# Patient Record
Sex: FEMALE | Race: WHITE | NOT HISPANIC OR LATINO | Employment: UNEMPLOYED | URBAN - METROPOLITAN AREA
[De-identification: names, ages, dates, MRNs, and addresses within clinical notes are randomized per-mention and may not be internally consistent; named-entity substitution may affect disease eponyms.]

---

## 2019-09-26 ENCOUNTER — TELEPHONE (OUTPATIENT)
Dept: FAMILY MEDICINE | Facility: CLINIC | Age: 59
End: 2019-09-26

## 2019-09-26 NOTE — TELEPHONE ENCOUNTER
----- Message from Matteo Burkett sent at 9/26/2019  1:17 PM CDT -----  Contact: pt  Type:  Sooner Apoointment Request    Caller is requesting a sooner appointment.  Caller declined first available appointment listed below.  Caller will not accept being placed on the waitlist and is requesting a message be sent to doctor.    Name of Caller:  pt  When is the first available appointment?  Epic kept pushing it out without giving a date     Best Call Back Number:  [phone]  Additional Information:  Pt would be a new pt. Pt has Medicare and Medicaid. Pt would like to est care with Dr. Hare.

## 2019-10-01 ENCOUNTER — TELEPHONE (OUTPATIENT)
Dept: FAMILY MEDICINE | Facility: CLINIC | Age: 59
End: 2019-10-01

## 2019-10-01 NOTE — TELEPHONE ENCOUNTER
Call placed to patient, advised that due to insurance we cannot reschedule the appointment as a new patient to family medicine.     Advised to call number on back of card

## 2019-10-01 NOTE — TELEPHONE ENCOUNTER
----- Message from Lizbet Walton sent at 10/1/2019 12:50 PM CDT -----  Type:  Sooner Apoointment Request    Caller is requesting a sooner appointment.     Name of Caller:  self  When is the first available appointment?  N/a   Symptoms:  Has a 1:20 appt today / cannot keep appointment / car / transportation issues   Best Call Back Number:  [phone] (home)     Additional Information:  Asking to reschedule to tomorrow

## 2019-10-09 ENCOUNTER — TELEPHONE (OUTPATIENT)
Dept: FAMILY MEDICINE | Facility: CLINIC | Age: 59
End: 2019-10-09

## 2019-10-11 ENCOUNTER — LAB VISIT (OUTPATIENT)
Dept: LAB | Facility: HOSPITAL | Age: 59
End: 2019-10-11
Attending: INTERNAL MEDICINE
Payer: MEDICARE

## 2019-10-11 ENCOUNTER — OFFICE VISIT (OUTPATIENT)
Dept: FAMILY MEDICINE | Facility: CLINIC | Age: 59
End: 2019-10-11
Payer: MEDICARE

## 2019-10-11 VITALS
SYSTOLIC BLOOD PRESSURE: 128 MMHG | BODY MASS INDEX: 20.16 KG/M2 | HEIGHT: 66 IN | HEART RATE: 78 BPM | DIASTOLIC BLOOD PRESSURE: 84 MMHG | OXYGEN SATURATION: 98 % | TEMPERATURE: 98 F | WEIGHT: 125.44 LBS

## 2019-10-11 DIAGNOSIS — M81.0 OSTEOPOROSIS, UNSPECIFIED OSTEOPOROSIS TYPE, UNSPECIFIED PATHOLOGICAL FRACTURE PRESENCE: ICD-10-CM

## 2019-10-11 DIAGNOSIS — Z12.39 SCREENING FOR BREAST CANCER: ICD-10-CM

## 2019-10-11 DIAGNOSIS — G89.4 CHRONIC PAIN SYNDROME: ICD-10-CM

## 2019-10-11 DIAGNOSIS — F43.22 ADJUSTMENT REACTION WITH ANXIETY: ICD-10-CM

## 2019-10-11 DIAGNOSIS — F10.180 ALCOHOL ABUSE WITH ALCOHOL-INDUCED ANXIETY DISORDER: ICD-10-CM

## 2019-10-11 DIAGNOSIS — F31.9 BIPOLAR 1 DISORDER: Primary | ICD-10-CM

## 2019-10-11 DIAGNOSIS — Z72.0 TOBACCO ABUSE: ICD-10-CM

## 2019-10-11 DIAGNOSIS — Z79.899 HIGH RISK MEDICATION USE: ICD-10-CM

## 2019-10-11 DIAGNOSIS — J44.9 CHRONIC OBSTRUCTIVE PULMONARY DISEASE, UNSPECIFIED COPD TYPE: ICD-10-CM

## 2019-10-11 DIAGNOSIS — Z76.89 ENCOUNTER TO ESTABLISH CARE: ICD-10-CM

## 2019-10-11 LAB
25(OH)D3+25(OH)D2 SERPL-MCNC: 31 NG/ML (ref 30–96)
ALBUMIN SERPL BCP-MCNC: 4.2 G/DL (ref 3.5–5.2)
ALP SERPL-CCNC: 116 U/L (ref 55–135)
ALT SERPL W/O P-5'-P-CCNC: 15 U/L (ref 10–44)
ANION GAP SERPL CALC-SCNC: 11 MMOL/L (ref 8–16)
AST SERPL-CCNC: 17 U/L (ref 10–40)
BILIRUB SERPL-MCNC: 0.2 MG/DL (ref 0.1–1)
BUN SERPL-MCNC: 17 MG/DL (ref 6–20)
CALCIUM SERPL-MCNC: 8.7 MG/DL (ref 8.7–10.5)
CHLORIDE SERPL-SCNC: 108 MMOL/L (ref 95–110)
CHOLEST SERPL-MCNC: 274 MG/DL (ref 120–199)
CHOLEST/HDLC SERPL: 3.4 {RATIO} (ref 2–5)
CO2 SERPL-SCNC: 26 MMOL/L (ref 23–29)
CREAT SERPL-MCNC: 0.7 MG/DL (ref 0.5–1.4)
ERYTHROCYTE [DISTWIDTH] IN BLOOD BY AUTOMATED COUNT: 14.5 % (ref 11.5–14.5)
EST. GFR  (AFRICAN AMERICAN): >60 ML/MIN/1.73 M^2
EST. GFR  (NON AFRICAN AMERICAN): >60 ML/MIN/1.73 M^2
GLUCOSE SERPL-MCNC: 82 MG/DL (ref 70–110)
HCT VFR BLD AUTO: 49.8 % (ref 37–48.5)
HDLC SERPL-MCNC: 80 MG/DL (ref 40–75)
HDLC SERPL: 29.2 % (ref 20–50)
HGB BLD-MCNC: 15.8 G/DL (ref 12–16)
LDLC SERPL CALC-MCNC: 149 MG/DL (ref 63–159)
MCH RBC QN AUTO: 30.6 PG (ref 27–31)
MCHC RBC AUTO-ENTMCNC: 31.7 G/DL (ref 32–36)
MCV RBC AUTO: 96 FL (ref 82–98)
NONHDLC SERPL-MCNC: 194 MG/DL
PLATELET # BLD AUTO: 257 K/UL (ref 150–350)
PMV BLD AUTO: 11.4 FL (ref 9.2–12.9)
POTASSIUM SERPL-SCNC: 4.2 MMOL/L (ref 3.5–5.1)
PROT SERPL-MCNC: 7.4 G/DL (ref 6–8.4)
RBC # BLD AUTO: 5.17 M/UL (ref 4–5.4)
SODIUM SERPL-SCNC: 145 MMOL/L (ref 136–145)
T4 FREE SERPL-MCNC: 0.86 NG/DL (ref 0.71–1.51)
TRIGL SERPL-MCNC: 225 MG/DL (ref 30–150)
TSH SERPL DL<=0.005 MIU/L-ACNC: 0.12 UIU/ML (ref 0.4–4)
WBC # BLD AUTO: 9.81 K/UL (ref 3.9–12.7)

## 2019-10-11 PROCEDURE — 85027 COMPLETE CBC AUTOMATED: CPT

## 2019-10-11 PROCEDURE — 84443 ASSAY THYROID STIM HORMONE: CPT

## 2019-10-11 PROCEDURE — 99204 PR OFFICE/OUTPT VISIT, NEW, LEVL IV, 45-59 MIN: ICD-10-PCS | Mod: S$PBB,,, | Performed by: INTERNAL MEDICINE

## 2019-10-11 PROCEDURE — 99999 PR PBB SHADOW E&M-EST. PATIENT-LVL III: CPT | Mod: PBBFAC,,, | Performed by: INTERNAL MEDICINE

## 2019-10-11 PROCEDURE — 80053 COMPREHEN METABOLIC PANEL: CPT

## 2019-10-11 PROCEDURE — 82306 VITAMIN D 25 HYDROXY: CPT

## 2019-10-11 PROCEDURE — 99213 OFFICE O/P EST LOW 20 MIN: CPT | Mod: PBBFAC,PO | Performed by: INTERNAL MEDICINE

## 2019-10-11 PROCEDURE — 80061 LIPID PANEL: CPT

## 2019-10-11 PROCEDURE — 99999 PR PBB SHADOW E&M-EST. PATIENT-LVL III: ICD-10-PCS | Mod: PBBFAC,,, | Performed by: INTERNAL MEDICINE

## 2019-10-11 PROCEDURE — 99204 OFFICE O/P NEW MOD 45 MIN: CPT | Mod: S$PBB,,, | Performed by: INTERNAL MEDICINE

## 2019-10-11 PROCEDURE — 84439 ASSAY OF FREE THYROXINE: CPT

## 2019-10-11 PROCEDURE — 36415 COLL VENOUS BLD VENIPUNCTURE: CPT | Mod: PO

## 2019-10-11 RX ORDER — ALBUTEROL SULFATE 90 UG/1
2 AEROSOL, METERED RESPIRATORY (INHALATION) EVERY 6 HOURS PRN
Qty: 18 G | Refills: 1 | Status: SHIPPED | OUTPATIENT
Start: 2019-10-11 | End: 2020-05-12

## 2019-10-11 RX ORDER — BUDESONIDE AND FORMOTEROL FUMARATE DIHYDRATE 80; 4.5 UG/1; UG/1
2 AEROSOL RESPIRATORY (INHALATION) 2 TIMES DAILY
Qty: 10.2 G | Refills: 2 | Status: SHIPPED | OUTPATIENT
Start: 2019-10-11 | End: 2019-12-07 | Stop reason: SDUPTHER

## 2019-10-11 RX ORDER — OLANZAPINE 10 MG/1
10 TABLET ORAL NIGHTLY
Qty: 30 TABLET | Refills: 1 | Status: SHIPPED | OUTPATIENT
Start: 2019-10-11 | End: 2019-12-07 | Stop reason: SDUPTHER

## 2019-10-11 NOTE — PROGRESS NOTES
Subjective     Lizbet Cooley is a 58 y.o. old, female here for Establish Care; Refills; Hard time functioning; and Hard time breathing    Patient is here to establish care.  She is a 58-year-old with past medical history of COPD, osteoporosis, alcohol abuse, anxiety, bipolar disorder, chronic pain.    She was recently incarcerated for almost 3 years.  She has been out of snf for the past 3 months and had difficulty coping.  She complains of racing thoughts, constant anxiety, difficulty relaxing, insomnia, and has resumed drinking alcohol at night.  She has a history of multiple DUI's which is what led to her incarceration.  She drinks 3-4 beers at night and had some from her family.    She is inconsistent regarding her history of mental illness.  She has been on benzodiazepines in the past such as Valium.  After she denies any significant issues but then endorses she has been diagnosed with bipolar disorder.  She has very little insight into her past psychiatric diagnoses.  She states that she has been on disability would is unsure if this was because of psychiatric reasons or because of her COPD.  She was living with 1 of her sons but left to live with her sister that is an alcoholic and her younger son that abuses methamphetamines and heroin.  Patient denies any recent illicit substance use.  She did buy her son Suboxone off the street.  She was having difficulty in that environment and recently started living with her daughter.  She would like to be able to get her own place and start working again.  She previously cleaned houses for living.  Past medication trials:  Prozac:  Made her feel like a zombie  Seroquel:  Made her fall, could not take during the day.  Valium:  Helped  She does not recall any other medications use in the past.    COPD:  She was previously on albuterol and Symbicort.  She started smoking again recently.    Osteoporosis:  No available records to review. She states that she was on  Fosamax previously.    Alcohol abuse:  See above, she has not previously been on treatment.    Review of Systems   Constitutional: Negative for chills and fever.   HENT: Negative for ear pain and sinus pain.    Eyes: Negative for blurred vision and pain.   Respiratory: Negative for cough and shortness of breath.    Cardiovascular: Negative for chest pain and palpitations.   Gastrointestinal: Negative for abdominal pain and nausea.   Genitourinary: Negative for dysuria and frequency.   Musculoskeletal: Negative for joint pain and myalgias.   Skin: Negative for itching and rash.   Neurological: Negative for weakness and headaches.   Endo/Heme/Allergies: Negative for environmental allergies. Does not bruise/bleed easily.   Psychiatric/Behavioral: Positive for substance abuse. Negative for depression. The patient is nervous/anxious and has insomnia.        Past Medical History:   Diagnosis Date    Anxiety     Bipolar disorder     COPD (chronic obstructive pulmonary disease)     Osteoporosis      History reviewed. No pertinent surgical history.  Review of patient's allergies indicates:  No Known Allergies  No outpatient medications have been marked as taking for the 10/11/19 encounter (Office Visit) with Lemuel Vaughan MD.     Social History     Socioeconomic History    Marital status: Single     Spouse name: Not on file    Number of children: Not on file    Years of education: Not on file    Highest education level: Not on file   Occupational History    Not on file   Social Needs    Financial resource strain: Not on file    Food insecurity:     Worry: Not on file     Inability: Not on file    Transportation needs:     Medical: Not on file     Non-medical: Not on file   Tobacco Use    Smoking status: Current Every Day Smoker     Packs/day: 1.00     Types: Cigarettes    Smokeless tobacco: Never Used   Substance and Sexual Activity    Alcohol use: Yes     Drinks per session: 3 or 4     Binge frequency:  "Daily or almost daily     Comment: h/o etoh abuse    Drug use: Not Currently    Sexual activity: Not Currently   Lifestyle    Physical activity:     Days per week: Not on file     Minutes per session: Not on file    Stress: Not on file   Relationships    Social connections:     Talks on phone: Not on file     Gets together: Not on file     Attends Jehov[employer]'s witness service: Not on file     Active member of club or organization: Not on file     Attends meetings of clubs or organizations: Not on file     Relationship status: Not on file   Other Topics Concern    Not on file   Social History Narrative    Not on file     Family History   Problem Relation Age of Onset    Alcohol abuse Sister     Drug abuse Son      Objective     /84   Pulse 78   Temp 98.4 °F (36.9 °C) (Temporal)   Ht 5' 6" (1.676 m)   Wt 56.9 kg (125 lb 7.1 oz)   SpO2 98%   BMI 20.25 kg/m²   Physical Exam   Constitutional: She is oriented to person, place, and time. She appears well-developed. No distress.   HENT:   Head: Normocephalic and atraumatic.   Mouth/Throat: Oropharynx is clear and moist and mucous membranes are normal.   Eyes: Pupils are equal, round, and reactive to light. Conjunctivae are normal.   Neck: Neck supple. No thyroid mass and no thyromegaly present.   Cardiovascular: Normal rate, regular rhythm and normal heart sounds.   No murmur heard.  Pulmonary/Chest: Breath sounds normal. No respiratory distress.   Abdominal: Soft. Normal appearance and bowel sounds are normal. There is no tenderness.   Musculoskeletal: She exhibits no edema or deformity.   Lymphadenopathy:     She has no cervical adenopathy.        Right: No supraclavicular adenopathy present.        Left: No supraclavicular adenopathy present.   Neurological: She is alert and oriented to person, place, and time.   Skin: Skin is warm and dry. No rash noted.   Psychiatric: Her mood appears anxious. Her speech is rapid and/or pressured. She is hyperactive. She " expresses impulsivity.     Assessment and Plan     1. Bipolar 1 disorder  F/u in 2 weeks.  - OLANZapine (ZYPREXA) 10 MG tablet; Take 1 tablet (10 mg total) by mouth every evening. Start 1/2 tab the first week then increase  Dispense: 30 tablet; Refill: 1  - Ambulatory referral to Psychiatry    2. Adjustment reaction with anxiety  In the setting of sig psychiatric co-morbid conditions and etoh abuse.    3. Chronic obstructive pulmonary disease, unspecified COPD type  restart  - budesonide-formoterol 80-4.5 mcg (SYMBICORT) 80-4.5 mcg/actuation HFAA; Inhale 2 puffs into the lungs 2 (two) times daily. Controller  Dispense: 10.2 g; Refill: 2  - albuterol (PROAIR HFA) 90 mcg/actuation inhaler; Inhale 2 puffs into the lungs every 6 (six) hours as needed for Wheezing. Rescue  Dispense: 18 g; Refill: 1    4. Osteoporosis, unspecified osteoporosis type, unspecified pathological fracture presence  Consider dexa and bisphosphonate therapy.  - TSH; Future  - Vitamin D; Future    5. Screening for breast cancer    6. Alcohol abuse with alcohol-induced anxiety disorder  Counseling, consider naltrexone or suboxone    7. Chronic pain syndrome  Prior chronic opiate use, sig FH of OUD and polysubstance abuse  High risk ORT    8. Tobacco abuse  Counseling.    9. High risk medication use  - CBC Without Differential; Future  - Comprehensive metabolic panel; Future  - Lipid panel; Future      ___________________  Lemuel Vaughan MD  Internal Medicine and Pediatrics

## 2019-11-09 ENCOUNTER — TELEPHONE (OUTPATIENT)
Dept: FAMILY MEDICINE | Facility: CLINIC | Age: 59
End: 2019-11-09

## 2019-11-09 NOTE — TELEPHONE ENCOUNTER
Called patient in regards to rescheduling her 2 week follow up. No answer. Forwarded straight to . Left  to return phone call to clinic.

## 2019-11-14 ENCOUNTER — OFFICE VISIT (OUTPATIENT)
Dept: ORTHOPEDICS | Facility: CLINIC | Age: 59
End: 2019-11-14
Payer: MEDICARE

## 2019-11-14 VITALS
DIASTOLIC BLOOD PRESSURE: 84 MMHG | HEART RATE: 84 BPM | HEIGHT: 66 IN | BODY MASS INDEX: 20.25 KG/M2 | WEIGHT: 126 LBS | SYSTOLIC BLOOD PRESSURE: 151 MMHG

## 2019-11-14 DIAGNOSIS — S82.852A TRIMALLEOLAR FRACTURE OF ANKLE, CLOSED, LEFT, INITIAL ENCOUNTER: ICD-10-CM

## 2019-11-14 DIAGNOSIS — F17.210 CIGARETTE NICOTINE DEPENDENCE WITHOUT COMPLICATION: ICD-10-CM

## 2019-11-14 PROCEDURE — 99999 PR PBB SHADOW E&M-EST. PATIENT-LVL III: ICD-10-PCS | Mod: PBBFAC,,, | Performed by: ORTHOPAEDIC SURGERY

## 2019-11-14 PROCEDURE — 29515 APPLICATION SHORT LEG SPLINT: CPT | Mod: S$PBB,LT,, | Performed by: ORTHOPAEDIC SURGERY

## 2019-11-14 PROCEDURE — 99213 OFFICE O/P EST LOW 20 MIN: CPT | Mod: PBBFAC,PN,25 | Performed by: ORTHOPAEDIC SURGERY

## 2019-11-14 PROCEDURE — 99204 PR OFFICE/OUTPT VISIT, NEW, LEVL IV, 45-59 MIN: ICD-10-PCS | Mod: S$PBB,25,, | Performed by: ORTHOPAEDIC SURGERY

## 2019-11-14 PROCEDURE — 99999 PR PBB SHADOW E&M-EST. PATIENT-LVL III: CPT | Mod: PBBFAC,,, | Performed by: ORTHOPAEDIC SURGERY

## 2019-11-14 PROCEDURE — 99406 PR TOBACCO USE CESSATION INTERMEDIATE 3-10 MINUTES: ICD-10-PCS | Mod: S$PBB,,, | Performed by: ORTHOPAEDIC SURGERY

## 2019-11-14 PROCEDURE — 99406 BEHAV CHNG SMOKING 3-10 MIN: CPT | Mod: S$PBB,,, | Performed by: ORTHOPAEDIC SURGERY

## 2019-11-14 PROCEDURE — 99204 OFFICE O/P NEW MOD 45 MIN: CPT | Mod: S$PBB,25,, | Performed by: ORTHOPAEDIC SURGERY

## 2019-11-14 PROCEDURE — 29515 APPLICATION SHORT LEG SPLINT: CPT | Mod: PBBFAC,PN | Performed by: ORTHOPAEDIC SURGERY

## 2019-11-14 PROCEDURE — 29515 PR APPLY LOWER LEG SPLINT: ICD-10-PCS | Mod: S$PBB,LT,, | Performed by: ORTHOPAEDIC SURGERY

## 2019-11-14 RX ORDER — ONDANSETRON 8 MG/1
8 TABLET, ORALLY DISINTEGRATING ORAL EVERY 6 HOURS PRN
Qty: 6 TABLET | Refills: 1 | Status: SHIPPED | OUTPATIENT
Start: 2019-11-14 | End: 2022-04-20

## 2019-11-14 RX ORDER — IBUPROFEN 200 MG
400 TABLET ORAL EVERY 6 HOURS PRN
COMMUNITY
End: 2020-05-10 | Stop reason: CLARIF

## 2019-11-14 RX ORDER — HYDROCODONE BITARTRATE AND ACETAMINOPHEN 5; 325 MG/1; MG/1
1 TABLET ORAL EVERY 6 HOURS PRN
Qty: 22 TABLET | Refills: 0 | Status: SHIPPED | OUTPATIENT
Start: 2019-11-14 | End: 2021-02-21 | Stop reason: SDUPTHER

## 2019-11-18 ENCOUNTER — TELEPHONE (OUTPATIENT)
Dept: ORTHOPEDICS | Facility: CLINIC | Age: 59
End: 2019-11-18

## 2019-11-18 NOTE — TELEPHONE ENCOUNTER
----- Message from Cornelius Jose sent at 11/18/2019 10:27 AM CST -----  Contact: pt  Patient needed to enrico which I did and she has questions about pain medication, [phone]

## 2019-11-18 NOTE — TELEPHONE ENCOUNTER
Spoke to patient. Advised that she really needs to come in the clinic today or tomorrow to have her skin elevated in preparation for surgery. Patient to call back.

## 2019-11-19 PROBLEM — Z72.0 TOBACCO ABUSE: Status: RESOLVED | Noted: 2019-10-11 | Resolved: 2019-11-19

## 2019-11-19 PROBLEM — F17.210 CIGARETTE NICOTINE DEPENDENCE WITHOUT COMPLICATION: Status: ACTIVE | Noted: 2019-11-19

## 2019-11-19 NOTE — PROGRESS NOTES
"HPI: Lizbet Cooley is a  59 y.o. female who complains of left ankle pain. The pain began acutely on 11/11/2019 when she tripped over a shoe injuring her ankle. She was seen in the ER and placed in a splint. She rates her pain as 10/10 today.  She has h/o nicotine and alcohol abuse. She says she drinks 2-3 beers per day. She smokes 1 ppd of cigarettes for 35 years.     PAST MEDICAL/SURGICAL/FAMILY/SOCIAL/ HISTORY: REVIEWED    ALLERGIES/MEDICATIONS: REVIEWED       Review of Systems:     Constitution: Negative.   HEENT: Negative.   Eyes: Negative.   Cardiovascular: Negative.   Respiratory: Negative.   Endocrine: Negative.   Hematologic/Lymphatic: Negative.   Skin: Negative.   Musculoskeletal: Positive for left ankle pain   Gastrointestinal: Negative.   Genitourinary: Negative.   Neurological: Negative.   Psychiatric/Behavioral: Negative.   Allergic/Immunologic: Negative.       PHYSICAL EXAM:  Vitals:    11/14/19 1337   BP: (!) 151/84   Pulse: 84     Ht Readings from Last 1 Encounters:   11/14/19 5' 6" (1.676 m)     Wt Readings from Last 1 Encounters:   11/14/19 57.2 kg (126 lb)       GENERAL: Well developed, well nourished, no acute distress.  SKIN: Skin is intact. No atrophy, abrasions or lesions are noted.   Neurological: Normal mental status. Appropriate and conversant. Alert and oriented x 3.  GAIT: Unable to bear weight LLE    Left  lower extremity compared with RLE:  2+ dorsalis pedis pulse.  Capillary refill < 3 seconds. Limited exam due to pain and swelling. Diminished Sensation to light touch sural, saphenous, superficial peroneal and deep peroneal nerves. Severe swelling and ecchymosis no blistering. No lymphadenopathy, no masses or tumors palpated.      XRAYS:   3 views of left ankle reviewed today show  trimalleolar ankle fracture with mild displacement.       ASSESSMENT:   Medications Ordered This Encounter   Medications    ondansetron (ZOFRAN-ODT) 8 MG TbDL       Encounter Diagnosis   Name Primary?    " Trimalleolar fracture of ankle, closed, left, initial encounter         PLAN:   I spent 45 minutes in consulation with the patient today. More than half the time was spent counseling the patient on her condition. I recommend ORIF left trimalleolar ankle frx once swelling has diminished.     Assistance with smoking cessation was offered, including:  [x]  Medications  [x]  Counseling  [x]  Printed Information on Smoking Cessation  [x]  Referral to a Smoking Cessation Program    Patient was counseled regarding smoking for 3-10 minutes. I told her that nicotine of any kind prevents bone healing.   Short leg splint applied to LLE,  Non-weightbearing.    I also wrote her an prescription for norco 5 sig 1 po q6 hrs prn pain and I reminded her not to take this medication with alcohol. Strict nwb and elevation.  I will recheck her skin on Monday.  Plan for surgery Wed if skin is appropriate.

## 2019-12-07 DIAGNOSIS — J44.9 CHRONIC OBSTRUCTIVE PULMONARY DISEASE, UNSPECIFIED COPD TYPE: ICD-10-CM

## 2019-12-07 DIAGNOSIS — F31.9 BIPOLAR 1 DISORDER: ICD-10-CM

## 2019-12-09 RX ORDER — OLANZAPINE 10 MG/1
10 TABLET ORAL NIGHTLY
Qty: 30 TABLET | Refills: 0 | Status: SHIPPED | OUTPATIENT
Start: 2019-12-09 | End: 2020-04-13 | Stop reason: SDUPTHER

## 2019-12-09 RX ORDER — BUDESONIDE AND FORMOTEROL FUMARATE DIHYDRATE 80; 4.5 UG/1; UG/1
AEROSOL RESPIRATORY (INHALATION)
Qty: 10.2 INHALER | Refills: 2 | Status: SHIPPED | OUTPATIENT
Start: 2019-12-09 | End: 2020-05-12

## 2020-01-02 DIAGNOSIS — F31.9 BIPOLAR 1 DISORDER: ICD-10-CM

## 2020-01-02 RX ORDER — OLANZAPINE 10 MG/1
TABLET ORAL
Qty: 30 TABLET | Refills: 0 | OUTPATIENT
Start: 2020-01-02

## 2020-04-11 DIAGNOSIS — F31.9 BIPOLAR 1 DISORDER: ICD-10-CM

## 2020-04-13 RX ORDER — OLANZAPINE 10 MG/1
TABLET ORAL
Qty: 30 TABLET | Refills: 1 | Status: SHIPPED | OUTPATIENT
Start: 2020-04-13 | End: 2020-05-10 | Stop reason: CLARIF

## 2020-05-12 DIAGNOSIS — J44.9 CHRONIC OBSTRUCTIVE PULMONARY DISEASE, UNSPECIFIED COPD TYPE: ICD-10-CM

## 2020-05-12 RX ORDER — ALBUTEROL SULFATE 90 UG/1
2 AEROSOL, METERED RESPIRATORY (INHALATION) EVERY 6 HOURS PRN
Qty: 18 G | Refills: 1 | Status: SHIPPED | OUTPATIENT
Start: 2020-05-12 | End: 2020-07-06

## 2020-05-12 RX ORDER — BUDESONIDE AND FORMOTEROL FUMARATE DIHYDRATE 80; 4.5 UG/1; UG/1
AEROSOL RESPIRATORY (INHALATION)
Qty: 10.2 INHALER | Refills: 2 | Status: SHIPPED | OUTPATIENT
Start: 2020-05-12 | End: 2020-05-22

## 2020-05-21 DIAGNOSIS — J44.9 CHRONIC OBSTRUCTIVE PULMONARY DISEASE, UNSPECIFIED COPD TYPE: ICD-10-CM

## 2020-05-22 RX ORDER — BUDESONIDE AND FORMOTEROL FUMARATE DIHYDRATE 80; 4.5 UG/1; UG/1
AEROSOL RESPIRATORY (INHALATION)
Qty: 10.2 INHALER | Refills: 2 | Status: SHIPPED | OUTPATIENT
Start: 2020-05-22 | End: 2021-01-26 | Stop reason: SDUPTHER

## 2021-01-13 ENCOUNTER — TELEPHONE (OUTPATIENT)
Dept: FAMILY MEDICINE | Facility: CLINIC | Age: 61
End: 2021-01-13

## 2021-01-26 ENCOUNTER — HOSPITAL ENCOUNTER (OUTPATIENT)
Dept: RADIOLOGY | Facility: HOSPITAL | Age: 61
Discharge: HOME | End: 2021-01-26
Attending: INTERNAL MEDICINE
Payer: MEDICARE

## 2021-01-26 ENCOUNTER — OFFICE VISIT (OUTPATIENT)
Dept: FAMILY MEDICINE | Facility: CLINIC | Age: 61
End: 2021-01-26
Payer: MEDICARE

## 2021-01-26 VITALS
TEMPERATURE: 98 F | DIASTOLIC BLOOD PRESSURE: 78 MMHG | HEART RATE: 99 BPM | BODY MASS INDEX: 20.46 KG/M2 | OXYGEN SATURATION: 97 % | WEIGHT: 126.75 LBS | SYSTOLIC BLOOD PRESSURE: 124 MMHG

## 2021-01-26 DIAGNOSIS — G89.4 CHRONIC PAIN SYNDROME: ICD-10-CM

## 2021-01-26 DIAGNOSIS — Z72.0 TOBACCO ABUSE: ICD-10-CM

## 2021-01-26 DIAGNOSIS — J40 BRONCHITIS: ICD-10-CM

## 2021-01-26 DIAGNOSIS — J40 BRONCHITIS: Primary | ICD-10-CM

## 2021-01-26 DIAGNOSIS — J44.9 CHRONIC OBSTRUCTIVE PULMONARY DISEASE, UNSPECIFIED COPD TYPE: ICD-10-CM

## 2021-01-26 DIAGNOSIS — Z79.891 CHRONIC PRESCRIPTION OPIATE USE: ICD-10-CM

## 2021-01-26 PROBLEM — F17.210 CIGARETTE NICOTINE DEPENDENCE WITHOUT COMPLICATION: Status: RESOLVED | Noted: 2019-11-19 | Resolved: 2021-01-26

## 2021-01-26 PROCEDURE — 99213 OFFICE O/P EST LOW 20 MIN: CPT | Mod: PBBFAC,PO,25 | Performed by: INTERNAL MEDICINE

## 2021-01-26 PROCEDURE — 71046 XR CHEST PA AND LATERAL: ICD-10-PCS | Mod: 26,,, | Performed by: RADIOLOGY

## 2021-01-26 PROCEDURE — 99214 OFFICE O/P EST MOD 30 MIN: CPT | Mod: S$GLB,,, | Performed by: INTERNAL MEDICINE

## 2021-01-26 PROCEDURE — 99499 RISK ADDL DX/OHS AUDIT: ICD-10-PCS | Mod: S$GLB,,, | Performed by: INTERNAL MEDICINE

## 2021-01-26 PROCEDURE — 99214 PR OFFICE/OUTPT VISIT, EST, LEVL IV, 30-39 MIN: ICD-10-PCS | Mod: S$GLB,,, | Performed by: INTERNAL MEDICINE

## 2021-01-26 PROCEDURE — 71046 X-RAY EXAM CHEST 2 VIEWS: CPT | Mod: TC,FY,PO

## 2021-01-26 PROCEDURE — 99499 UNLISTED E&M SERVICE: CPT | Mod: S$GLB,,, | Performed by: INTERNAL MEDICINE

## 2021-01-26 PROCEDURE — 99999 PR PBB SHADOW E&M-EST. PATIENT-LVL III: CPT | Mod: PBBFAC,,, | Performed by: INTERNAL MEDICINE

## 2021-01-26 PROCEDURE — 71046 X-RAY EXAM CHEST 2 VIEWS: CPT | Mod: 26,,, | Performed by: RADIOLOGY

## 2021-01-26 PROCEDURE — 99999 PR PBB SHADOW E&M-EST. PATIENT-LVL III: ICD-10-PCS | Mod: PBBFAC,,, | Performed by: INTERNAL MEDICINE

## 2021-01-26 RX ORDER — NALOXONE HYDROCHLORIDE 4 MG/.1ML
SPRAY NASAL
COMMUNITY
Start: 2021-01-14

## 2021-01-26 RX ORDER — BENZONATATE 200 MG/1
200 CAPSULE ORAL 3 TIMES DAILY PRN
Qty: 20 CAPSULE | Refills: 0 | Status: SHIPPED | OUTPATIENT
Start: 2021-01-26 | End: 2021-02-05

## 2021-01-26 RX ORDER — OXYCODONE AND ACETAMINOPHEN 10; 325 MG/1; MG/1
TABLET ORAL
COMMUNITY
Start: 2021-01-14

## 2021-01-26 RX ORDER — BENZONATATE 100 MG/1
CAPSULE ORAL
COMMUNITY
Start: 2021-01-15 | End: 2021-01-26

## 2021-01-26 RX ORDER — AMOXICILLIN AND CLAVULANATE POTASSIUM 875; 125 MG/1; MG/1
1 TABLET, FILM COATED ORAL EVERY 12 HOURS
Qty: 14 TABLET | Refills: 0 | Status: SHIPPED | OUTPATIENT
Start: 2021-01-26 | End: 2021-02-02

## 2021-01-26 RX ORDER — BUDESONIDE AND FORMOTEROL FUMARATE DIHYDRATE 80; 4.5 UG/1; UG/1
2 AEROSOL RESPIRATORY (INHALATION) 2 TIMES DAILY
Qty: 10.2 INHALER | Refills: 2 | Status: SHIPPED | OUTPATIENT
Start: 2021-01-26 | End: 2021-09-13 | Stop reason: SDUPTHER

## 2021-01-28 ENCOUNTER — TELEPHONE (OUTPATIENT)
Dept: FAMILY MEDICINE | Facility: CLINIC | Age: 61
End: 2021-01-28

## 2021-01-28 DIAGNOSIS — Z72.0 TOBACCO ABUSE: ICD-10-CM

## 2021-01-28 DIAGNOSIS — J44.9 CHRONIC OBSTRUCTIVE PULMONARY DISEASE, UNSPECIFIED COPD TYPE: Primary | ICD-10-CM

## 2021-01-29 ENCOUNTER — TELEPHONE (OUTPATIENT)
Dept: FAMILY MEDICINE | Facility: CLINIC | Age: 61
End: 2021-01-29

## 2021-01-29 ENCOUNTER — TELEPHONE (OUTPATIENT)
Dept: PULMONOLOGY | Facility: CLINIC | Age: 61
End: 2021-01-29

## 2021-01-29 NOTE — TELEPHONE ENCOUNTER
Bartolome Calvin I tried scheduling a new pt. appt. from referral for patient, but schedule was saying there is a block.  Can you please call pt. to schedule with Haley.  Pt's would like to be called at [phone] or [phone]. Thanks!

## 2021-01-29 NOTE — TELEPHONE ENCOUNTER
I called patient was not able to schedule a new pt. appt with Mr Haley Ramos at [facility] due to a block on schedule.  Informed patient that I would send a message to medical assistant at [facility] to call her to schedule appt.

## 2021-01-29 NOTE — TELEPHONE ENCOUNTER
----- Message from Filemon Price sent at 1/29/2021 10:48 AM CST -----  Contact: Pt@ [phone]  Pt calling in regards to schedule referral for Chronic obstructive pulmonary disease, unspecified COPD type [J44.9]  Tobacco abuse [Z72.0], attempted to schedule wasn't able.     Pt@ [phone]

## 2021-02-09 ENCOUNTER — PATIENT OUTREACH (OUTPATIENT)
Dept: ADMINISTRATIVE | Facility: HOSPITAL | Age: 61
End: 2021-02-09

## 2021-02-10 ENCOUNTER — OFFICE VISIT (OUTPATIENT)
Dept: PULMONOLOGY | Facility: CLINIC | Age: 61
End: 2021-02-10
Payer: MEDICARE

## 2021-02-10 VITALS
SYSTOLIC BLOOD PRESSURE: 142 MMHG | BODY MASS INDEX: 19.88 KG/M2 | DIASTOLIC BLOOD PRESSURE: 76 MMHG | OXYGEN SATURATION: 97 % | HEART RATE: 86 BPM | HEIGHT: 66 IN | WEIGHT: 123.69 LBS

## 2021-02-10 DIAGNOSIS — Z72.0 TOBACCO ABUSE: ICD-10-CM

## 2021-02-10 DIAGNOSIS — R49.9 CHANGE IN VOICE: ICD-10-CM

## 2021-02-10 DIAGNOSIS — R91.1 LUNG NODULE SEEN ON IMAGING STUDY: ICD-10-CM

## 2021-02-10 DIAGNOSIS — J44.9 CHRONIC OBSTRUCTIVE PULMONARY DISEASE, UNSPECIFIED COPD TYPE: Primary | ICD-10-CM

## 2021-02-10 PROCEDURE — 99999 PR PBB SHADOW E&M-EST. PATIENT-LVL V: ICD-10-PCS | Mod: PBBFAC,,, | Performed by: NURSE PRACTITIONER

## 2021-02-10 PROCEDURE — 99999 PR PBB SHADOW E&M-EST. PATIENT-LVL V: CPT | Mod: PBBFAC,,, | Performed by: NURSE PRACTITIONER

## 2021-02-10 PROCEDURE — 99215 OFFICE O/P EST HI 40 MIN: CPT | Mod: PBBFAC,PN | Performed by: NURSE PRACTITIONER

## 2021-02-10 PROCEDURE — 99204 PR OFFICE/OUTPT VISIT, NEW, LEVL IV, 45-59 MIN: ICD-10-PCS | Mod: S$GLB,,, | Performed by: NURSE PRACTITIONER

## 2021-02-10 PROCEDURE — 99204 OFFICE O/P NEW MOD 45 MIN: CPT | Mod: S$GLB,,, | Performed by: NURSE PRACTITIONER

## 2021-02-15 DIAGNOSIS — R05.9 COUGH: Primary | ICD-10-CM

## 2021-02-15 RX ORDER — BENZONATATE 200 MG/1
200 CAPSULE ORAL 3 TIMES DAILY PRN
Qty: 30 CAPSULE | Refills: 0 | Status: SHIPPED | OUTPATIENT
Start: 2021-02-15 | End: 2021-02-25

## 2021-02-17 ENCOUNTER — TELEPHONE (OUTPATIENT)
Dept: PULMONOLOGY | Facility: CLINIC | Age: 61
End: 2021-02-17

## 2021-02-21 PROBLEM — R91.1 LUNG NODULE SEEN ON IMAGING STUDY: Status: ACTIVE | Noted: 2021-02-21

## 2021-02-21 PROBLEM — R49.9 CHANGE IN VOICE: Status: ACTIVE | Noted: 2021-02-21

## 2021-02-22 ENCOUNTER — OFFICE VISIT (OUTPATIENT)
Dept: OTOLARYNGOLOGY | Facility: CLINIC | Age: 61
End: 2021-02-22
Payer: MEDICARE

## 2021-02-22 VITALS — BODY MASS INDEX: 20.73 KG/M2 | WEIGHT: 129 LBS | HEIGHT: 66 IN

## 2021-02-22 DIAGNOSIS — Z72.0 TOBACCO ABUSE: ICD-10-CM

## 2021-02-22 DIAGNOSIS — R49.9 CHANGE IN VOICE: ICD-10-CM

## 2021-02-22 DIAGNOSIS — R49.0 DYSPHONIA: Primary | ICD-10-CM

## 2021-02-22 DIAGNOSIS — J38.7 VALLECULAR CYST: ICD-10-CM

## 2021-02-22 DIAGNOSIS — J37.0 CHRONIC LARYNGITIS: ICD-10-CM

## 2021-02-22 DIAGNOSIS — J38.1 REINKE'S EDEMA OF VOCAL FOLDS: ICD-10-CM

## 2021-02-22 PROCEDURE — 31575 DIAGNOSTIC LARYNGOSCOPY: CPT | Mod: S$PBB,,, | Performed by: NURSE PRACTITIONER

## 2021-02-22 PROCEDURE — 99214 OFFICE O/P EST MOD 30 MIN: CPT | Mod: PBBFAC,PO | Performed by: NURSE PRACTITIONER

## 2021-02-22 PROCEDURE — 99999 PR PBB SHADOW E&M-EST. PATIENT-LVL IV: ICD-10-PCS | Mod: PBBFAC,,, | Performed by: NURSE PRACTITIONER

## 2021-02-22 PROCEDURE — 99999 PR PBB SHADOW E&M-EST. PATIENT-LVL IV: CPT | Mod: PBBFAC,,, | Performed by: NURSE PRACTITIONER

## 2021-02-22 PROCEDURE — 99203 OFFICE O/P NEW LOW 30 MIN: CPT | Mod: 25,S$PBB,, | Performed by: NURSE PRACTITIONER

## 2021-02-22 PROCEDURE — 31575 DIAGNOSTIC LARYNGOSCOPY: CPT | Mod: PBBFAC,PO | Performed by: NURSE PRACTITIONER

## 2021-02-22 PROCEDURE — 99203 PR OFFICE/OUTPT VISIT, NEW, LEVL III, 30-44 MIN: ICD-10-PCS | Mod: 25,S$PBB,, | Performed by: NURSE PRACTITIONER

## 2021-02-22 PROCEDURE — 31575 PR LARYNGOSCOPY, FLEXIBLE; DIAGNOSTIC: ICD-10-PCS | Mod: S$PBB,,, | Performed by: NURSE PRACTITIONER

## 2021-02-23 ENCOUNTER — LAB VISIT (OUTPATIENT)
Dept: LAB | Facility: HOSPITAL | Age: 61
End: 2021-02-23
Attending: INTERNAL MEDICINE
Payer: MEDICARE

## 2021-02-23 ENCOUNTER — OFFICE VISIT (OUTPATIENT)
Dept: FAMILY MEDICINE | Facility: CLINIC | Age: 61
End: 2021-02-23
Payer: MEDICARE

## 2021-02-23 VITALS
SYSTOLIC BLOOD PRESSURE: 138 MMHG | BODY MASS INDEX: 21.01 KG/M2 | WEIGHT: 126.13 LBS | RESPIRATION RATE: 16 BRPM | TEMPERATURE: 98 F | HEIGHT: 65 IN | DIASTOLIC BLOOD PRESSURE: 84 MMHG | HEART RATE: 79 BPM | OXYGEN SATURATION: 98 %

## 2021-02-23 DIAGNOSIS — Z79.891 CHRONIC PRESCRIPTION OPIATE USE: ICD-10-CM

## 2021-02-23 DIAGNOSIS — J44.9 CHRONIC OBSTRUCTIVE PULMONARY DISEASE, UNSPECIFIED COPD TYPE: ICD-10-CM

## 2021-02-23 DIAGNOSIS — Z00.00 PHYSICAL EXAM, ROUTINE: ICD-10-CM

## 2021-02-23 DIAGNOSIS — Z00.00 PHYSICAL EXAM, ROUTINE: Primary | ICD-10-CM

## 2021-02-23 DIAGNOSIS — Z12.31 ENCOUNTER FOR SCREENING MAMMOGRAM FOR BREAST CANCER: ICD-10-CM

## 2021-02-23 DIAGNOSIS — Z78.0 POSTMENOPAUSAL: ICD-10-CM

## 2021-02-23 DIAGNOSIS — Z72.0 TOBACCO USE: ICD-10-CM

## 2021-02-23 LAB
ERYTHROCYTE [DISTWIDTH] IN BLOOD BY AUTOMATED COUNT: 14.3 % (ref 11.5–14.5)
HCT VFR BLD AUTO: 46.4 % (ref 37–48.5)
HGB BLD-MCNC: 15 G/DL (ref 12–16)
MCH RBC QN AUTO: 31.3 PG (ref 27–31)
MCHC RBC AUTO-ENTMCNC: 32.3 G/DL (ref 32–36)
MCV RBC AUTO: 97 FL (ref 82–98)
PLATELET # BLD AUTO: 198 K/UL (ref 150–350)
PMV BLD AUTO: 12.6 FL (ref 9.2–12.9)
RBC # BLD AUTO: 4.79 M/UL (ref 4–5.4)
WBC # BLD AUTO: 8.21 K/UL (ref 3.9–12.7)

## 2021-02-23 PROCEDURE — 99499 RISK ADDL DX/OHS AUDIT: ICD-10-PCS | Mod: S$GLB,,, | Performed by: INTERNAL MEDICINE

## 2021-02-23 PROCEDURE — 99214 OFFICE O/P EST MOD 30 MIN: CPT | Mod: PBBFAC,PO,25 | Performed by: INTERNAL MEDICINE

## 2021-02-23 PROCEDURE — 86703 HIV-1/HIV-2 1 RESULT ANTBDY: CPT

## 2021-02-23 PROCEDURE — 85027 COMPLETE CBC AUTOMATED: CPT

## 2021-02-23 PROCEDURE — 90471 IMMUNIZATION ADMIN: CPT | Mod: PBBFAC,PO

## 2021-02-23 PROCEDURE — 99499 UNLISTED E&M SERVICE: CPT | Mod: S$GLB,,, | Performed by: INTERNAL MEDICINE

## 2021-02-23 PROCEDURE — 80053 COMPREHEN METABOLIC PANEL: CPT

## 2021-02-23 PROCEDURE — 99396 PREV VISIT EST AGE 40-64: CPT | Mod: S$GLB,,, | Performed by: INTERNAL MEDICINE

## 2021-02-23 PROCEDURE — 36415 COLL VENOUS BLD VENIPUNCTURE: CPT | Mod: PO

## 2021-02-23 PROCEDURE — 99999 PR PBB SHADOW E&M-EST. PATIENT-LVL IV: ICD-10-PCS | Mod: PBBFAC,,, | Performed by: INTERNAL MEDICINE

## 2021-02-23 PROCEDURE — 86803 HEPATITIS C AB TEST: CPT

## 2021-02-23 PROCEDURE — 99396 PR PREVENTIVE VISIT,EST,40-64: ICD-10-PCS | Mod: S$GLB,,, | Performed by: INTERNAL MEDICINE

## 2021-02-23 PROCEDURE — 99999 PR PBB SHADOW E&M-EST. PATIENT-LVL IV: CPT | Mod: PBBFAC,,, | Performed by: INTERNAL MEDICINE

## 2021-02-24 ENCOUNTER — TELEPHONE (OUTPATIENT)
Dept: SPEECH THERAPY | Facility: HOSPITAL | Age: 61
End: 2021-02-24

## 2021-02-24 LAB
ALBUMIN SERPL BCP-MCNC: 4.2 G/DL (ref 3.5–5.2)
ALP SERPL-CCNC: 144 U/L (ref 55–135)
ALT SERPL W/O P-5'-P-CCNC: 38 U/L (ref 10–44)
ANION GAP SERPL CALC-SCNC: 10 MMOL/L (ref 8–16)
AST SERPL-CCNC: 29 U/L (ref 10–40)
BILIRUB SERPL-MCNC: 0.5 MG/DL (ref 0.1–1)
BUN SERPL-MCNC: 14 MG/DL (ref 6–20)
CALCIUM SERPL-MCNC: 9.4 MG/DL (ref 8.7–10.5)
CHLORIDE SERPL-SCNC: 105 MMOL/L (ref 95–110)
CO2 SERPL-SCNC: 25 MMOL/L (ref 23–29)
CREAT SERPL-MCNC: 0.6 MG/DL (ref 0.5–1.4)
EST. GFR  (AFRICAN AMERICAN): >60 ML/MIN/1.73 M^2
EST. GFR  (NON AFRICAN AMERICAN): >60 ML/MIN/1.73 M^2
GLUCOSE SERPL-MCNC: 97 MG/DL (ref 70–110)
HCV AB SERPL QL IA: NEGATIVE
HIV 1+2 AB+HIV1 P24 AG SERPL QL IA: NEGATIVE
POTASSIUM SERPL-SCNC: 5.1 MMOL/L (ref 3.5–5.1)
PROT SERPL-MCNC: 7 G/DL (ref 6–8.4)
SODIUM SERPL-SCNC: 140 MMOL/L (ref 136–145)

## 2021-03-03 ENCOUNTER — HOSPITAL ENCOUNTER (OUTPATIENT)
Dept: RADIOLOGY | Facility: HOSPITAL | Age: 61
Discharge: HOME | End: 2021-03-03
Attending: NURSE PRACTITIONER
Payer: MEDICARE

## 2021-03-03 ENCOUNTER — HOSPITAL ENCOUNTER (OUTPATIENT)
Dept: RADIOLOGY | Facility: HOSPITAL | Age: 61
Discharge: HOME | End: 2021-03-03
Attending: INTERNAL MEDICINE
Payer: MEDICARE

## 2021-03-03 DIAGNOSIS — Z12.31 BREAST CANCER SCREENING BY MAMMOGRAM: ICD-10-CM

## 2021-03-03 DIAGNOSIS — R49.9 CHANGE IN VOICE: ICD-10-CM

## 2021-03-03 DIAGNOSIS — J38.7 VALLECULAR CYST: ICD-10-CM

## 2021-03-03 DIAGNOSIS — Z72.0 TOBACCO ABUSE: ICD-10-CM

## 2021-03-03 DIAGNOSIS — Z00.00 PHYSICAL EXAM, ROUTINE: ICD-10-CM

## 2021-03-03 DIAGNOSIS — J37.0 CHRONIC LARYNGITIS: ICD-10-CM

## 2021-03-03 PROCEDURE — 70491 CT SOFT TISSUE NECK W/DYE: CPT | Mod: 26,,, | Performed by: RADIOLOGY

## 2021-03-03 PROCEDURE — 70491 CT SOFT TISSUE NECK WITH CONTRAST: ICD-10-PCS | Mod: 26,,, | Performed by: RADIOLOGY

## 2021-03-03 PROCEDURE — 77063 MAMMO DIGITAL SCREENING BILAT WITH TOMO: ICD-10-PCS | Mod: 26,,, | Performed by: RADIOLOGY

## 2021-03-03 PROCEDURE — 70491 CT SOFT TISSUE NECK W/DYE: CPT | Mod: TC,PO

## 2021-03-03 PROCEDURE — 77067 SCR MAMMO BI INCL CAD: CPT | Mod: TC,PO

## 2021-03-03 PROCEDURE — 77067 MAMMO DIGITAL SCREENING BILAT WITH TOMO: ICD-10-PCS | Mod: 26,,, | Performed by: RADIOLOGY

## 2021-03-03 PROCEDURE — 25500020 PHARM REV CODE 255: Mod: PO | Performed by: NURSE PRACTITIONER

## 2021-03-03 PROCEDURE — 77067 SCR MAMMO BI INCL CAD: CPT | Mod: 26,,, | Performed by: RADIOLOGY

## 2021-03-03 PROCEDURE — 77063 BREAST TOMOSYNTHESIS BI: CPT | Mod: 26,,, | Performed by: RADIOLOGY

## 2021-03-03 RX ADMIN — IOHEXOL 75 ML: 350 INJECTION, SOLUTION INTRAVENOUS at 11:03

## 2021-03-18 ENCOUNTER — TELEPHONE (OUTPATIENT)
Dept: SPEECH THERAPY | Facility: HOSPITAL | Age: 61
End: 2021-03-18

## 2021-03-30 ENCOUNTER — DOCUMENTATION ONLY (OUTPATIENT)
Dept: PULMONOLOGY | Facility: CLINIC | Age: 61
End: 2021-03-30

## 2021-09-10 DIAGNOSIS — J44.9 CHRONIC OBSTRUCTIVE PULMONARY DISEASE, UNSPECIFIED COPD TYPE: ICD-10-CM

## 2021-09-10 RX ORDER — ALBUTEROL SULFATE 90 UG/1
2 AEROSOL, METERED RESPIRATORY (INHALATION) EVERY 6 HOURS PRN
Qty: 18 G | Refills: 1 | Status: SHIPPED | OUTPATIENT
Start: 2021-09-10 | End: 2021-09-13 | Stop reason: SDUPTHER

## 2021-09-13 DIAGNOSIS — J44.9 CHRONIC OBSTRUCTIVE PULMONARY DISEASE, UNSPECIFIED COPD TYPE: ICD-10-CM

## 2021-09-13 RX ORDER — ALBUTEROL SULFATE 90 UG/1
2 AEROSOL, METERED RESPIRATORY (INHALATION) EVERY 6 HOURS PRN
Qty: 18 G | Refills: 1 | Status: SHIPPED | OUTPATIENT
Start: 2021-09-13 | End: 2021-09-16 | Stop reason: SDUPTHER

## 2021-09-13 RX ORDER — BUDESONIDE AND FORMOTEROL FUMARATE DIHYDRATE 80; 4.5 UG/1; UG/1
2 AEROSOL RESPIRATORY (INHALATION) 2 TIMES DAILY
Qty: 10.2 INHALER | Refills: 2 | Status: SHIPPED | OUTPATIENT
Start: 2021-09-13 | End: 2021-09-16 | Stop reason: SDUPTHER

## 2021-09-16 DIAGNOSIS — J44.9 CHRONIC OBSTRUCTIVE PULMONARY DISEASE, UNSPECIFIED COPD TYPE: ICD-10-CM

## 2021-09-16 RX ORDER — BUDESONIDE AND FORMOTEROL FUMARATE DIHYDRATE 80; 4.5 UG/1; UG/1
2 AEROSOL RESPIRATORY (INHALATION) 2 TIMES DAILY
Qty: 10.2 INHALER | Refills: 2 | Status: SHIPPED | OUTPATIENT
Start: 2021-09-16 | End: 2022-04-20 | Stop reason: SDUPTHER

## 2021-09-16 RX ORDER — ALBUTEROL SULFATE 90 UG/1
2 AEROSOL, METERED RESPIRATORY (INHALATION) EVERY 6 HOURS PRN
Qty: 18 G | Refills: 1 | Status: SHIPPED | OUTPATIENT
Start: 2021-09-16 | End: 2023-11-16 | Stop reason: SDUPTHER

## 2022-01-04 ENCOUNTER — HOSPITAL ENCOUNTER (EMERGENCY)
Facility: HOSPITAL | Age: 62
Discharge: HOME | End: 2022-01-04
Attending: EMERGENCY MEDICINE
Payer: MEDICARE

## 2022-01-04 VITALS
RESPIRATION RATE: 18 BRPM | BODY MASS INDEX: 17.68 KG/M2 | TEMPERATURE: 98 F | DIASTOLIC BLOOD PRESSURE: 90 MMHG | OXYGEN SATURATION: 98 % | HEART RATE: 87 BPM | WEIGHT: 110 LBS | SYSTOLIC BLOOD PRESSURE: 156 MMHG | HEIGHT: 66 IN

## 2022-01-04 DIAGNOSIS — S00.81XA ABRASION OF FACE, INITIAL ENCOUNTER: ICD-10-CM

## 2022-01-04 DIAGNOSIS — S01.81XA FACIAL LACERATION, INITIAL ENCOUNTER: Primary | ICD-10-CM

## 2022-01-04 PROCEDURE — 12014 RPR F/E/E/N/L/M 5.1-7.5 CM: CPT | Mod: HCNC,ER

## 2022-01-04 PROCEDURE — 99284 EMERGENCY DEPT VISIT MOD MDM: CPT | Mod: 25,HCNC,ER

## 2022-01-04 PROCEDURE — 25000003 PHARM REV CODE 250: Mod: HCNC,ER | Performed by: EMERGENCY MEDICINE

## 2022-01-04 PROCEDURE — 25000003 PHARM REV CODE 250: Mod: HCNC,ER | Performed by: NURSE PRACTITIONER

## 2022-01-04 RX ORDER — ACETAMINOPHEN 500 MG
1000 TABLET ORAL
Status: COMPLETED | OUTPATIENT
Start: 2022-01-04 | End: 2022-01-04

## 2022-01-04 RX ORDER — LIDOCAINE HYDROCHLORIDE AND EPINEPHRINE 10; 10 MG/ML; UG/ML
10 INJECTION, SOLUTION INFILTRATION; PERINEURAL
Status: COMPLETED | OUTPATIENT
Start: 2022-01-04 | End: 2022-01-04

## 2022-01-04 RX ADMIN — ACETAMINOPHEN 1000 MG: 500 TABLET ORAL at 10:01

## 2022-01-04 RX ADMIN — LIDOCAINE HYDROCHLORIDE AND EPINEPHRINE 10 ML: 10; 10 INJECTION, SOLUTION INFILTRATION; PERINEURAL at 10:01

## 2022-01-04 RX ADMIN — LIDOCAINE-EPINEPHRINE-TETRACAINE GEL 4-0.05-0.5%: 4-0.05-0.5 GEL at 09:01

## 2022-01-04 NOTE — FIRST PROVIDER EVALUATION
"Medical screening exam completed.  I have conducted a focused provider triage encounter, findings are as follows:    Brief history of present illness:  Patient with fall and injury to the left forehead and eyebrow.  Significant laceration appreciated.  Bleeding not controlled this time.    Vitals:    01/04/22 0835   BP: (!) 156/90   BP Location: Right arm   Patient Position: Sitting   Pulse: 91   Resp: 18   Temp: 98 °F (36.7 °C)   TempSrc: Oral   SpO2: 98%   Weight: 49.9 kg (110 lb)   Height: 5' 6" (1.676 m)       Pertinent physical exam:  Laceration, facial contusion.    Brief workup plan:  CT head, CT neck, and let.    Preliminary workup initiated; this workup will be continued and followed by the physician or advanced practice provider that is assigned to the patient when roomed.  "

## 2022-01-04 NOTE — ED PROVIDER NOTES
"Encounter Date: 1/4/2022    SCRIBE #1 NOTE: I, Nadia Hernandez, am scribing for, and in the presence of,  Fany Yarbrough NP. I have scribed the following portions of the note - Other sections scribed: HPI, ROS, PE.       History     Chief Complaint   Patient presents with    Laceration     Pt was moving furniture and fell and hit left side of face. Pt has a large lac above left eye. Bleeding is controlled at this time.      Lizbet Cooley is a 61 y.o. female who presents to the ED for evaluation of an open wound located on her left forehead x 2 hours ago. Pt states that she was moving furniture and fell on the concrete. She reports an associated symptom of headache. Denies nausea, vomiting, diarrhea, arthralgias, or vision changes. Pt reports that she is not on any blood thinners. She also reports that she has never had stitches before. She states that she is unaware about when her last tetanus shot was, but she believes it was 4 years ago. Pt mentions that she is allergic to codeine.      The history is provided by the patient. No  was used.     Review of patient's allergies indicates:  No Known Allergies  Past Medical History:   Diagnosis Date    Anxiety     Bipolar disorder     COPD (chronic obstructive pulmonary disease)     Osteoporosis      Past Surgical History:   Procedure Laterality Date    BREAST BIOPSY      HYSTERECTOMY       Family History   Problem Relation Age of Onset    Alcohol abuse Sister     Drug abuse Son      Social History     Tobacco Use    Smoking status: Current Every Day Smoker     Packs/day: 1.00     Types: Cigarettes    Smokeless tobacco: Never Used   Substance Use Topics    Alcohol use: Yes     Alcohol/week: 2.0 standard drinks     Types: 2 Cans of beer per week     Comment: patient states "I drink a couple beers a day"    Drug use: Not Currently     Review of Systems   Constitutional: Negative for activity change.   Eyes: Negative for visual disturbance. "   Respiratory: Negative for shortness of breath.    Cardiovascular: Negative for chest pain.   Gastrointestinal: Negative for diarrhea, nausea and vomiting.   Musculoskeletal: Negative for arthralgias.   Skin: Positive for wound.   Neurological: Positive for headaches.   All other systems reviewed and are negative.      Physical Exam     Initial Vitals [01/04/22 0835]   BP Pulse Resp Temp SpO2   (!) 156/90 91 18 98 °F (36.7 °C) 98 %      MAP       --         Physical Exam    Nursing note and vitals reviewed.  Constitutional: She appears well-developed and well-nourished.   HENT:   Head: Normocephalic.   Cardiovascular: Normal rate, regular rhythm and normal heart sounds.     Neurological: She is alert and oriented to person, place, and time. She has normal strength.         ED Course   Procedures  Labs Reviewed - No data to display       Imaging Results          CT Cervical Spine Without Contrast (Final result)  Result time 01/04/22 10:06:08    Final result by Lucho Bourgeois MD (01/04/22 10:06:08)                 Impression:      No obvious evidence of an acute traumatic injury involving the cervical vertebral column.    Generalized diffuse osteoarthritic changes which are described as minimal to moderate.      Electronically signed by: Lucho Bourgeois  Date:    01/04/2022  Time:    10:06             Narrative:    EXAMINATION:  CT CERVICAL SPINE WITHOUT CONTRAST    CLINICAL HISTORY:  Neck trauma, uncomplicated (NEXUS/CCR neg) (Age 16-64y);Neck trauma, midline tenderness (Age 16-64y);    TECHNIQUE:  Low dose axial images, sagittal and coronal reformations were performed though the cervical spine.  Contrast was not administered.    COMPARISON:  None    FINDINGS:  Imaging of the cervical vertebral column indicates straightening of lordotic curvature.  The vertebral bodies appear to be well mineralized.  There is no indication of spondylolisthesis.  There is loss of intervertebral disc spacing between C4-C5 and  C5-C6.  There are examples of anterior and posterior bridging osteophytes.  The facet joints align normally with minimal amount of sclerosis of the articular surfaces.  The spinous processes also align normally.    In the coronal plane the lateral masses are symmetric in appearance and align.  The odontoid is unremarkable.  All of the articular surfaces show mild sclerosis.  Very little osteophyte formation is noted.    In the axial plane there does not appear to be any obvious injury involving the lamina or pedicles of any of the cervical vertebral bodies.                                CT Head Without Contrast (Final result)  Result time 01/04/22 10:03:04    Final result by Lucho Bourgeois MD (01/04/22 10:03:04)                 Impression:      No apparent evidence of an acute injury involving the skull base skull or facial bones.    Apparent soft tissue defect seen involving the superior aspect of the left orbit roughly at the level of the eye brow.    Irregular appearance of the external auditory canal on the left suggestive of otitis externa.    This report was flagged in Epic as abnormal.      Electronically signed by: Lucho Bourgeois  Date:    01/04/2022  Time:    10:03             Narrative:    EXAMINATION:  CT HEAD WITHOUT CONTRAST    CLINICAL HISTORY:  Facial trauma, blunt;Head trauma, moderate-severe;    TECHNIQUE:  Low dose axial images were obtained through the head.  Coronal and sagittal reformations were also performed. Contrast was not administered.    COMPARISON:  None.    FINDINGS:  The bone window portion of the examination indicates no obvious evidence of an acute fracture injury of the skull.  The skull base appears normal.  No indication of paranasal sinus congestion.  There is deviation of the nasal septum towards the right.  The mastoid air cells are unremarkable.  Incidental note is made of a thickening and serpiginous appearance of the external auditory canal on the right.    Imaging of  "the brain parenchyma reveals no evidence of a mass edema midline shift or extra-axial fluid collection.  The gray-white interface is intact.    The lateral ventricles 3rd ventricle and 4th ventricle are also unremarkable.  No obvious gross abnormality involving the cistern or the cerebellum.    In the sagittal plane the corpus callosum, mid brain, brainstem and proximal spinal cord all appear normal.  No indication of a suprasellar mass.  The pituitary and optic chiasm are unremarkable.    The contents of the orbits show no obvious gross abnormality.    Note is made of what appears to be a soft tissue defect just above the left eye most likely representing a laceration or abrasion.                                 Medications   LIDOcaine-EPINEPHrine 1%-1:100,000 injection 10 mL (has no administration in time range)   acetaminophen tablet 1,000 mg (has no administration in time range)   LETS (LIDOcaine-TETRAcaine-EPINEPHrine) gel solution ( Topical (Top) Given 1/4/22 0905)     Medical Decision Making:   History:   Old Medical Records: I decided to obtain old medical records.  Clinical Tests:   Radiological Study: Ordered and Reviewed          Scribe Attestation:   Scribe #1: I performed the above scribed service and the documentation accurately describes the services I performed. I attest to the accuracy of the note.               ***  Clinical Impression:    ***Please document a Clinical Impression and click the "Refresh" button to refresh your note and automatically pull in before signing.***           "

## 2022-01-04 NOTE — ED PROVIDER NOTES
"Encounter Date: 1/4/2022    SCRIBE #1 NOTE: I, Nadia Hernandez, am scribing for, and in the presence of,  Fany Yarbrough NP. I have scribed the following portions of the note - Other sections scribed: HPI, ROS, PE.       History     Chief Complaint   Patient presents with    Laceration     Pt was moving furniture and fell and hit left side of face. Pt has a large lac above left eye. Bleeding is controlled at this time.      Lizbet Cooley is a 61 y.o. female who presents to the ED for evaluation of left eyebrow laceration x 2 hours ago. Pt states that she was moving furniture and fell on the concrete. She reports an associated symptom of headache. Denies nausea, vomiting, numbness, tingling,  arthralgias, or eye pain, vision changes. Pt reports that she is not on any blood thinners.  She states that she is unaware about when her last tetanus shot was, but she believes it was 4 years ago. Pt mentions that she is allergic to codeine.       The history is provided by the patient. No  was used.     Review of patient's allergies indicates:  No Known Allergies  Past Medical History:   Diagnosis Date    Anxiety     Bipolar disorder     COPD (chronic obstructive pulmonary disease)     Osteoporosis      Past Surgical History:   Procedure Laterality Date    BREAST BIOPSY      HYSTERECTOMY       Family History   Problem Relation Age of Onset    Alcohol abuse Sister     Drug abuse Son      Social History     Tobacco Use    Smoking status: Current Every Day Smoker     Packs/day: 1.00     Types: Cigarettes    Smokeless tobacco: Never Used   Substance Use Topics    Alcohol use: Yes     Alcohol/week: 2.0 standard drinks     Types: 2 Cans of beer per week     Comment: patient states "I drink a couple beers a day"    Drug use: Not Currently     Review of Systems   Constitutional: Negative for activity change.   Eyes: Negative for visual disturbance.   Respiratory: Negative for shortness of breath.  " "  Cardiovascular: Negative for chest pain.   Gastrointestinal: Negative for diarrhea, nausea and vomiting.   Musculoskeletal: Negative for arthralgias.   Skin: Positive for wound.   Neurological: Positive for headaches.   All other systems reviewed and are negative.      Physical Exam     Initial Vitals [01/04/22 0835]   BP Pulse Resp Temp SpO2   (!) 156/90 91 18 98 °F (36.7 °C) 98 %      MAP       --         Physical Exam    Nursing note and vitals reviewed.  Constitutional: She appears well-developed and well-nourished. She is not diaphoretic. No distress.   HENT:   Head: Normocephalic and atraumatic.   Right Ear: External ear normal.   Left Ear: External ear normal.   Nose: Nose normal.   Mouth/Throat: Oropharynx is clear and moist. No oropharyngeal exudate.   Eyes: Conjunctivae and EOM are normal. Pupils are equal, round, and reactive to light. Right eye exhibits no discharge. Left eye exhibits no discharge.   3cm x 3cm "v shaped" laceration to left eyebrow. PERRLA. EOMI without limitation or pain.    Neck:   Normal range of motion.  Pulmonary/Chest: No respiratory distress.   Musculoskeletal:         General: Normal range of motion.      Cervical back: Normal range of motion.     Neurological: She is alert and oriented to person, place, and time.   Skin: Skin is warm and dry. Laceration noted.   Psychiatric: She has a normal mood and affect. Her behavior is normal.         ED Course   Lac Repair    Date/Time: 1/4/2022 10:54 AM  Performed by: Fany Yarbrough NP  Authorized by: Gill Tate DO     Consent:     Consent obtained:  Verbal    Consent given by:  Patient    Risks, benefits, and alternatives were discussed: yes      Risks discussed:  Infection, pain, need for additional repair, poor cosmetic result, nerve damage, poor wound healing, tendon damage and vascular damage  Anesthesia:     Anesthesia method:  Topical application and local infiltration    Topical anesthetic:  LET    Local anesthetic:  " Lidocaine 1% WITH epi  Laceration details:     Location:  Face    Face location:  L eyebrow    Length (cm):  6  Pre-procedure details:     Preparation:  Patient was prepped and draped in usual sterile fashion and imaging obtained to evaluate for foreign bodies  Exploration:     Hemostasis achieved with:  Direct pressure and LET    Imaging outcome: foreign body not noted      Wound exploration: entire depth of wound visualized    Treatment:     Area cleansed with:  Saline    Amount of cleaning:  Standard    Irrigation solution:  Sterile saline    Irrigation method:  Syringe  Skin repair:     Repair method:  Sutures    Suture size:  5-0    Suture material:  Nylon    Suture technique:  Simple interrupted    Number of sutures:  8  Approximation:     Approximation:  Close  Repair type:     Repair type:  Simple  Post-procedure details:     Dressing:  Adhesive bandage    Procedure completion:  Tolerated well, no immediate complications      Labs Reviewed - No data to display       Imaging Results          CT Cervical Spine Without Contrast (Final result)  Result time 01/04/22 10:06:08    Final result by Lucho Bourgeois MD (01/04/22 10:06:08)                 Impression:      No obvious evidence of an acute traumatic injury involving the cervical vertebral column.    Generalized diffuse osteoarthritic changes which are described as minimal to moderate.      Electronically signed by: Lucho Bourgeois  Date:    01/04/2022  Time:    10:06             Narrative:    EXAMINATION:  CT CERVICAL SPINE WITHOUT CONTRAST    CLINICAL HISTORY:  Neck trauma, uncomplicated (NEXUS/CCR neg) (Age 16-64y);Neck trauma, midline tenderness (Age 16-64y);    TECHNIQUE:  Low dose axial images, sagittal and coronal reformations were performed though the cervical spine.  Contrast was not administered.    COMPARISON:  None    FINDINGS:  Imaging of the cervical vertebral column indicates straightening of lordotic curvature.  The vertebral bodies appear  to be well mineralized.  There is no indication of spondylolisthesis.  There is loss of intervertebral disc spacing between C4-C5 and C5-C6.  There are examples of anterior and posterior bridging osteophytes.  The facet joints align normally with minimal amount of sclerosis of the articular surfaces.  The spinous processes also align normally.    In the coronal plane the lateral masses are symmetric in appearance and align.  The odontoid is unremarkable.  All of the articular surfaces show mild sclerosis.  Very little osteophyte formation is noted.    In the axial plane there does not appear to be any obvious injury involving the lamina or pedicles of any of the cervical vertebral bodies.                                CT Head Without Contrast (Final result)  Result time 01/04/22 10:03:04    Final result by Lucho Bourgeois MD (01/04/22 10:03:04)                 Impression:      No apparent evidence of an acute injury involving the skull base skull or facial bones.    Apparent soft tissue defect seen involving the superior aspect of the left orbit roughly at the level of the eye brow.    Irregular appearance of the external auditory canal on the left suggestive of otitis externa.    This report was flagged in Epic as abnormal.      Electronically signed by: Lucho Bourgeois  Date:    01/04/2022  Time:    10:03             Narrative:    EXAMINATION:  CT HEAD WITHOUT CONTRAST    CLINICAL HISTORY:  Facial trauma, blunt;Head trauma, moderate-severe;    TECHNIQUE:  Low dose axial images were obtained through the head.  Coronal and sagittal reformations were also performed. Contrast was not administered.    COMPARISON:  None.    FINDINGS:  The bone window portion of the examination indicates no obvious evidence of an acute fracture injury of the skull.  The skull base appears normal.  No indication of paranasal sinus congestion.  There is deviation of the nasal septum towards the right.  The mastoid air cells are  unremarkable.  Incidental note is made of a thickening and serpiginous appearance of the external auditory canal on the right.    Imaging of the brain parenchyma reveals no evidence of a mass edema midline shift or extra-axial fluid collection.  The gray-white interface is intact.    The lateral ventricles 3rd ventricle and 4th ventricle are also unremarkable.  No obvious gross abnormality involving the cistern or the cerebellum.    In the sagittal plane the corpus callosum, mid brain, brainstem and proximal spinal cord all appear normal.  No indication of a suprasellar mass.  The pituitary and optic chiasm are unremarkable.    The contents of the orbits show no obvious gross abnormality.    Note is made of what appears to be a soft tissue defect just above the left eye most likely representing a laceration or abrasion.                                 Medications   LETS (LIDOcaine-TETRAcaine-EPINEPHrine) gel solution ( Topical (Top) Given 1/4/22 0905)   LIDOcaine-EPINEPHrine 1%-1:100,000 injection 10 mL (10 mLs Intradermal Given 1/4/22 1016)   acetaminophen tablet 1,000 mg (1,000 mg Oral Given 1/4/22 1015)     Medical Decision Making:   History:   Old Medical Records: I decided to obtain old medical records.  Clinical Tests:   Radiological Study: Ordered and Reviewed  ED Management:  This is an evaluation of a 61 y.o. female that presents to the Emergency Department for a Laceration. Physical Exam shows a non-toxic, afebrile, and well appearing female. There is a laceration to left eyebrow. PERRLA. EOMI without limitation or pain. There is no surrounding erythema or area of increased warmth. Facial compartments are soft. There is full ROM of Head and neck against resistance. Equal sensation to the extremity. No visible tendon lacerations observed on exam.  The wound was irrigated and visually inspected prior to closure. No visible foreign bodies noted. Vital Signs Are Reassuring. Tetanus is up to date.     RESULTS:  CT head and cervical spine without acute intracranial or bone/skull abnormality.  CT head with irregular appearance of the external auditory canal suggesting left otitis externa.  Patient denies any left ear pain.  Left canal clear and nontender.     The wound was closed per the procedure note.     My overall impression is Laceration. I considered, but at this time, do not suspect cellulitis, compartment syndrome, underlying fracture, ocular injury or suspect any retained foreign body at this time.     D/C Information: Laceration/Wound Care/Suture Removal instructions given. The diagnosis, treatment plan, instructions for follow-up and reevaluation with her PCP or Return to ED in 7 days for suture removal as well as ED return precautions were discussed and understanding was verbalized. All questions or concerns have been addressed. This case was discussed with and the patient has been  examined by Dr. Tate who is in agreement with my assessment and plan.           Scribe Attestation:   Scribe #1: I performed the above scribed service and the documentation accurately describes the services I performed. I attest to the accuracy of the note.                Scribe attestation: I, CRICKET Yarbrough, personally performed the services described in this documentation. All medical record entries made by the scribe were at my direction and in my presence.  I have reviewed the chart and agree that the record reflects my personal performance and is accurate and complete.  Clinical Impression:   Final diagnoses:  [S01.81XA] Facial laceration, initial encounter (Primary)  [S00.81XA] Abrasion of face, initial encounter          ED Disposition Condition    Discharge Stable        ED Prescriptions     None        Follow-up Information     Follow up With Specialties Details Why Contact Info    Lemuel Vaughan MD Family Medicine Schedule an appointment as soon as possible for a visit in 1 week For suture removal, For wound re-check  [address] [place] [address]  [phone]      [facility] PLASTIC SURGERY Plastic Surgery Schedule an appointment as soon as possible for a visit  For follow-up, For wound re-check [address]  [place] [id number] [phone]    [facility] ED Emergency Medicine Go to  If symptoms worsen [address] [facility] [address] [phone]           Fany Yarbrough NP  01/04/22 1102       Fany Yarbrough NP  01/04/22 1102

## 2022-01-21 ENCOUNTER — PATIENT OUTREACH (OUTPATIENT)
Dept: ADMINISTRATIVE | Facility: HOSPITAL | Age: 62
End: 2022-01-21
Payer: MEDICARE

## 2022-03-30 DIAGNOSIS — Z12.31 OTHER SCREENING MAMMOGRAM: ICD-10-CM

## 2022-04-20 ENCOUNTER — HOSPITAL ENCOUNTER (OUTPATIENT)
Dept: RADIOLOGY | Facility: HOSPITAL | Age: 62
Discharge: HOME | End: 2022-04-20
Attending: PHYSICIAN ASSISTANT
Payer: MEDICARE

## 2022-04-20 ENCOUNTER — OFFICE VISIT (OUTPATIENT)
Dept: FAMILY MEDICINE | Facility: CLINIC | Age: 62
End: 2022-04-20
Payer: MEDICARE

## 2022-04-20 VITALS
WEIGHT: 113.31 LBS | RESPIRATION RATE: 16 BRPM | TEMPERATURE: 98 F | HEIGHT: 66 IN | HEART RATE: 80 BPM | BODY MASS INDEX: 18.21 KG/M2 | DIASTOLIC BLOOD PRESSURE: 78 MMHG | OXYGEN SATURATION: 98 % | SYSTOLIC BLOOD PRESSURE: 138 MMHG

## 2022-04-20 DIAGNOSIS — R07.81 RIB PAIN ON LEFT SIDE: ICD-10-CM

## 2022-04-20 DIAGNOSIS — F10.180 ALCOHOL ABUSE WITH ALCOHOL-INDUCED ANXIETY DISORDER: ICD-10-CM

## 2022-04-20 DIAGNOSIS — J44.9 CHRONIC OBSTRUCTIVE PULMONARY DISEASE, UNSPECIFIED COPD TYPE: ICD-10-CM

## 2022-04-20 DIAGNOSIS — R25.1 OCCASIONAL TREMORS: Primary | ICD-10-CM

## 2022-04-20 DIAGNOSIS — F10.288 ALCOHOL DEPENDENCE WITH OTHER ALCOHOL-INDUCED DISORDER: ICD-10-CM

## 2022-04-20 DIAGNOSIS — F43.22 ADJUSTMENT REACTION WITH ANXIETY: ICD-10-CM

## 2022-04-20 DIAGNOSIS — Z79.891 CHRONIC PRESCRIPTION OPIATE USE: ICD-10-CM

## 2022-04-20 DIAGNOSIS — F31.9 BIPOLAR 1 DISORDER: ICD-10-CM

## 2022-04-20 DIAGNOSIS — Z72.0 TOBACCO USE: ICD-10-CM

## 2022-04-20 DIAGNOSIS — G89.4 CHRONIC PAIN SYNDROME: ICD-10-CM

## 2022-04-20 PROBLEM — S82.852A TRIMALLEOLAR FRACTURE OF ANKLE, CLOSED, LEFT, INITIAL ENCOUNTER: Status: RESOLVED | Noted: 2019-11-14 | Resolved: 2022-04-20

## 2022-04-20 PROCEDURE — 1160F PR REVIEW ALL MEDS BY PRESCRIBER/CLIN PHARMACIST DOCUMENTED: ICD-10-PCS | Mod: CPTII,S$GLB,, | Performed by: PHYSICIAN ASSISTANT

## 2022-04-20 PROCEDURE — 99214 OFFICE O/P EST MOD 30 MIN: CPT | Mod: S$GLB,,, | Performed by: PHYSICIAN ASSISTANT

## 2022-04-20 PROCEDURE — 3075F SYST BP GE 130 - 139MM HG: CPT | Mod: CPTII,S$GLB,, | Performed by: PHYSICIAN ASSISTANT

## 2022-04-20 PROCEDURE — 1159F PR MEDICATION LIST DOCUMENTED IN MEDICAL RECORD: ICD-10-PCS | Mod: CPTII,S$GLB,, | Performed by: PHYSICIAN ASSISTANT

## 2022-04-20 PROCEDURE — 99999 PR PBB SHADOW E&M-EST. PATIENT-LVL V: CPT | Mod: PBBFAC,,, | Performed by: PHYSICIAN ASSISTANT

## 2022-04-20 PROCEDURE — 3078F DIAST BP <80 MM HG: CPT | Mod: CPTII,S$GLB,, | Performed by: PHYSICIAN ASSISTANT

## 2022-04-20 PROCEDURE — 71100 XR RIBS 2 VIEW LEFT: ICD-10-PCS | Mod: 26,LT,, | Performed by: RADIOLOGY

## 2022-04-20 PROCEDURE — 99999 PR PBB SHADOW E&M-EST. PATIENT-LVL V: ICD-10-PCS | Mod: PBBFAC,,, | Performed by: PHYSICIAN ASSISTANT

## 2022-04-20 PROCEDURE — 71100 X-RAY EXAM RIBS UNI 2 VIEWS: CPT | Mod: TC,FY,PO,LT

## 2022-04-20 PROCEDURE — 1160F RVW MEDS BY RX/DR IN RCRD: CPT | Mod: CPTII,S$GLB,, | Performed by: PHYSICIAN ASSISTANT

## 2022-04-20 PROCEDURE — 71100 X-RAY EXAM RIBS UNI 2 VIEWS: CPT | Mod: 26,LT,, | Performed by: RADIOLOGY

## 2022-04-20 PROCEDURE — 3075F PR MOST RECENT SYSTOLIC BLOOD PRESS GE 130-139MM HG: ICD-10-PCS | Mod: CPTII,S$GLB,, | Performed by: PHYSICIAN ASSISTANT

## 2022-04-20 PROCEDURE — 99499 RISK ADDL DX/OHS AUDIT: ICD-10-PCS | Mod: S$GLB,,, | Performed by: PHYSICIAN ASSISTANT

## 2022-04-20 PROCEDURE — 1159F MED LIST DOCD IN RCRD: CPT | Mod: CPTII,S$GLB,, | Performed by: PHYSICIAN ASSISTANT

## 2022-04-20 PROCEDURE — 3008F BODY MASS INDEX DOCD: CPT | Mod: CPTII,S$GLB,, | Performed by: PHYSICIAN ASSISTANT

## 2022-04-20 PROCEDURE — 3078F PR MOST RECENT DIASTOLIC BLOOD PRESSURE < 80 MM HG: ICD-10-PCS | Mod: CPTII,S$GLB,, | Performed by: PHYSICIAN ASSISTANT

## 2022-04-20 PROCEDURE — 3008F PR BODY MASS INDEX (BMI) DOCUMENTED: ICD-10-PCS | Mod: CPTII,S$GLB,, | Performed by: PHYSICIAN ASSISTANT

## 2022-04-20 PROCEDURE — 99499 UNLISTED E&M SERVICE: CPT | Mod: S$GLB,,, | Performed by: PHYSICIAN ASSISTANT

## 2022-04-20 PROCEDURE — 99214 PR OFFICE/OUTPT VISIT, EST, LEVL IV, 30-39 MIN: ICD-10-PCS | Mod: S$GLB,,, | Performed by: PHYSICIAN ASSISTANT

## 2022-04-20 RX ORDER — BUDESONIDE AND FORMOTEROL FUMARATE DIHYDRATE 80; 4.5 UG/1; UG/1
2 AEROSOL RESPIRATORY (INHALATION) 2 TIMES DAILY
Qty: 10.2 G | Refills: 1 | Status: SHIPPED | OUTPATIENT
Start: 2022-04-20 | End: 2023-11-16 | Stop reason: SDUPTHER

## 2022-04-20 RX ORDER — OLANZAPINE 10 MG/1
10 TABLET ORAL NIGHTLY
Qty: 30 TABLET | Refills: 1 | Status: SHIPPED | OUTPATIENT
Start: 2022-04-20 | End: 2023-11-16 | Stop reason: SDUPTHER

## 2022-04-20 NOTE — PATIENT INSTRUCTIONS
A few reminders from today:    Start olzanapine  Labs today  Symbicort refilled  X ray today  Schedule with neurology  F/U 2 weeks    Follow up with me if needed.   Please go to ER/urgent care if after hours or symptoms persist/worsen.     Do not hesitate to get in touch with me should you have any further questions.     Thank you for trusting me with your care!  I wish you health and happiness.    -Beverly Cardenas PA-C

## 2022-04-20 NOTE — PROGRESS NOTES
"Subjective:       Patient ID: iLzbet Cooley is a 61 y.o. female.    Chief Complaint: Tremors (Whole body ), Chest Pain (X 2 days   feeling "pops" on left side rib ), and Nausea    HPI     Pt is new to me, PCP Dr. Vaughan.     Pt is a 61 year old female with CPS, BP1, hx of alcohol abuse, rx opiod use, COPD, osteoporosis, and tobacco use. She presents today complaining of voice changes and full body tremors. The change in her voice is something she has been struggling with for many months. Was evaluated by ENT and speech therapy was recommended. Pt never made it to speech therapy.She feels her voice tremors are getting worse. She feels like she has to force her words out. In addition, she notes new onset full body tremors over the last few months. These are intermittent and they do worsen when the patient has increased anxiety. She states that her anxiety has been extremely high lately. She is constantly worrying that "something awful might happen" to one of her family members. Denies recent elizabeth, hallucinations, delusions, SI/HI. Pt denies current illicit drug use. Still taking prescribed pain meds and drinking 3-4 beers every night.     In addition, pt complains of  Left rib pain x 3 days. Pt states she was pulling something very heavy in the yard with her left arm and she felt something pop at her left lower ribs. Since then, she has been in pain. Painful to take full, deep breath, but not SOB at baseline.     Review of Systems   Constitutional: Negative for activity change, appetite change, chills, fatigue, fever and unexpected weight change.   HENT: Positive for voice change. Negative for nasal congestion, ear pain, hearing loss, rhinorrhea and sore throat.    Eyes: Negative for visual disturbance.   Respiratory: Negative for cough, chest tightness, shortness of breath and wheezing.    Cardiovascular: Negative for chest pain and palpitations.   Gastrointestinal: Negative for abdominal pain, change in bowel " habit, constipation, diarrhea, nausea, vomiting, reflux and change in bowel habit.   Genitourinary: Negative for difficulty urinating, dysuria, frequency, hematuria and urgency.   Musculoskeletal: Negative for arthralgias and myalgias.   Integumentary:  Negative for rash.   Neurological: Positive for tremors and speech difficulty. Negative for dizziness, vertigo, seizures, syncope, facial asymmetry, weakness, light-headedness, numbness, headaches, disturbances in coordination, memory loss and coordination difficulties.   Psychiatric/Behavioral: Positive for behavioral problems and dysphoric mood. Negative for hallucinations, self-injury, sleep disturbance and suicidal ideas. The patient is nervous/anxious. The patient is not hyperactive.          Objective:      Physical Exam  Vitals and nursing note reviewed.   Constitutional:       General: She is not in acute distress.     Appearance: Normal appearance. She is ill-appearing. She is not toxic-appearing or diaphoretic.   HENT:      Head: Normocephalic and atraumatic.      Right Ear: External ear normal.      Left Ear: External ear normal.      Mouth/Throat:      Mouth: Mucous membranes are moist.      Pharynx: Oropharynx is clear. No oropharyngeal exudate or posterior oropharyngeal erythema.      Comments: Hoarse voice with interrupted phonation  Eyes:      General: No scleral icterus.        Right eye: No discharge.         Left eye: No discharge.      Extraocular Movements: Extraocular movements intact.      Conjunctiva/sclera: Conjunctivae normal.      Pupils: Pupils are equal, round, and reactive to light.   Cardiovascular:      Rate and Rhythm: Normal rate and regular rhythm.      Pulses: Normal pulses.      Heart sounds: Normal heart sounds. No murmur heard.    No friction rub. No gallop.   Pulmonary:      Effort: Pulmonary effort is normal. No respiratory distress.      Breath sounds: Normal breath sounds. No stridor. No wheezing, rhonchi or rales.    Abdominal:      General: Abdomen is flat. Bowel sounds are normal. There is no distension.      Palpations: Abdomen is soft. There is no mass.      Tenderness: There is no abdominal tenderness. There is no right CVA tenderness, left CVA tenderness, guarding or rebound.   Musculoskeletal:         General: No deformity or signs of injury.      Cervical back: Normal range of motion and neck supple.      Right lower leg: No edema.      Left lower leg: No edema.   Lymphadenopathy:      Cervical: No cervical adenopathy.   Skin:     General: Skin is warm.      Capillary Refill: Capillary refill takes less than 2 seconds.      Coloration: Skin is not jaundiced or pale.      Findings: No lesion or rash.   Neurological:      General: No focal deficit present.      Mental Status: She is alert and oriented to person, place, and time. Mental status is at baseline.      Sensory: Sensation is intact.      Motor: Tremor present. No weakness.      Coordination: Coordination is intact.   Psychiatric:         Mood and Affect: Mood is anxious.         Behavior: Behavior normal.         Thought Content: Thought content normal.         Judgment: Judgment normal.         Assessment:       Problem List Items Addressed This Visit        Neuro    Chronic pain syndrome       Psychiatric    Bipolar 1 disorder    Relevant Medications    OLANZapine (ZYPREXA) 10 MG tablet    Adjustment reaction with anxiety    Alcohol abuse with alcohol-induced anxiety disorder    Chronic prescription opiate use       Pulmonary    Chronic obstructive pulmonary disease    Relevant Medications    budesonide-formoterol 80-4.5 mcg (SYMBICORT) 80-4.5 mcg/actuation HFAA       Other    Tobacco use      Other Visit Diagnoses     Occasional tremors    -  Primary    Relevant Orders    Ambulatory referral/consult to Neurology    CBC Auto Differential    Comprehensive Metabolic Panel    TSH    Vitamin B12    Folate    VITAMIN B1    Alcohol dependence with other  alcohol-induced disorder        Relevant Orders    Vitamin B12    Folate    Rib pain on left side        Relevant Orders    X-Ray Ribs 2 View Left          Plan:     1. Occasional tremors  - Ambulatory referral/consult to Neurology; Future  - CBC Auto Differential; Future  - Comprehensive Metabolic Panel; Future  - TSH; Future  - Vitamin B12; Future  - Folate; Future  - VITAMIN B1; Future    2. Bipolar 1 disorder  - OLANZapine (ZYPREXA) 10 MG tablet; Take 1 tablet (10 mg total) by mouth every evening. Start with 1/2 tab nightly for the first week  Dispense: 30 tablet; Refill: 1    3. Adjustment reaction with anxiety    4. Alcohol abuse with alcohol-induced anxiety disorder  -restart olanzapine, currently on no anxiety or psych meds    5. Alcohol dependence with other alcohol-induced disorder  - Vitamin B12; Future  - Folate; Future    6. Chronic obstructive pulmonary disease, unspecified COPD type  -refill given  - budesonide-formoterol 80-4.5 mcg (SYMBICORT) 80-4.5 mcg/actuation HFAA; Inhale 2 puffs into the lungs 2 (two) times a day. Controller  Dispense: 10.2 g; Refill: 1    7. Rib pain on left side  - X-Ray Ribs 2 View Left; Future    8. Chronic pain syndrome    9. Chronic prescription opiate use    10. Tobacco use    RTC/ER precautions given. F/U 2 weeks since re-initiating zyprexa

## 2022-05-13 ENCOUNTER — TELEPHONE (OUTPATIENT)
Dept: NEUROLOGY | Facility: CLINIC | Age: 62
End: 2022-05-13
Payer: MEDICARE

## 2022-06-29 ENCOUNTER — TELEPHONE (OUTPATIENT)
Dept: FAMILY MEDICINE | Facility: CLINIC | Age: 62
End: 2022-06-29
Payer: MEDICARE

## 2022-06-29 DIAGNOSIS — Z79.891 CHRONIC PRESCRIPTION OPIATE USE: Primary | ICD-10-CM

## 2022-06-29 NOTE — TELEPHONE ENCOUNTER
----- Message from Ambika Strickland sent at 6/29/2022  9:09 AM CDT -----  Contact: Pt  Type: Needs Medical Advice    Who Called:  Pt    Symptoms (please be specific):  Back pain    Pharmacy name and phone #:    [facility] - [place], [place] - [address] [facility] [address] [facility] [address]  [address] [address] [facility] [address] [facility] [address]  Phone: [phone] Fax: [phone]    Best Call Back Number: [phone]    Additional Information: Please call back.  Thanks.

## 2022-06-29 NOTE — TELEPHONE ENCOUNTER
Spoke with patient, she is needing a new pain management doctor. Can you sign referral and I will help her get scheduled with someone here.

## 2022-06-29 NOTE — TELEPHONE ENCOUNTER
Spoke with patient, got her scheduled with pain management for the next available but it is not until 07/29. She wanted to know if you would see her and prescribed her a month of pain medication until she can see them. Please advise    Reminded her about mammogram. Attempted to schedule appointment but she said she would wait and schedule it later.

## 2022-07-21 ENCOUNTER — TELEPHONE (OUTPATIENT)
Dept: PAIN MEDICINE | Facility: CLINIC | Age: 62
End: 2022-07-21
Payer: MEDICARE

## 2022-07-21 NOTE — TELEPHONE ENCOUNTER
Can you please let this patient know that I have reviewed her .    We do not take over opioid prescribing from other providers.    She will need to follow-up with her previous prescribing physician, Dr. Johnson, if she has questions about further opioid prescriptions.

## 2022-07-25 NOTE — TELEPHONE ENCOUNTER
Explained provider message to patient. Appointment cancelled and list of alternate providers sent.

## 2022-08-17 ENCOUNTER — TELEPHONE (OUTPATIENT)
Dept: FAMILY MEDICINE | Facility: CLINIC | Age: 62
End: 2022-08-17
Payer: MEDICARE

## 2022-08-17 DIAGNOSIS — Z12.31 ENCOUNTER FOR SCREENING MAMMOGRAM FOR BREAST CANCER: ICD-10-CM

## 2022-08-17 DIAGNOSIS — Z79.891 CHRONIC PRESCRIPTION OPIATE USE: Primary | ICD-10-CM

## 2022-08-17 NOTE — TELEPHONE ENCOUNTER
----- Message from Beata Connors sent at 8/17/2022  9:08 AM CDT -----  Contact: Patient  Type: Patient Call Back         Who called: Patient         What is the request in detail: requesting a referral be faxed to Dr. Lemuel Castro in [place] for pain management; please advise           Best call back number:  [phone] - Patient         Additional Information: Dr. Lemuel Castro - phone [phone]; fax [phone]           Thank You

## 2022-11-04 ENCOUNTER — OFFICE VISIT (OUTPATIENT)
Dept: NEUROLOGY | Facility: CLINIC | Age: 62
End: 2022-11-04
Payer: MEDICARE

## 2022-11-04 ENCOUNTER — LAB VISIT (OUTPATIENT)
Dept: LAB | Facility: HOSPITAL | Age: 62
End: 2022-11-04
Payer: MEDICARE

## 2022-11-04 VITALS
SYSTOLIC BLOOD PRESSURE: 164 MMHG | BODY MASS INDEX: 19.09 KG/M2 | HEART RATE: 81 BPM | HEIGHT: 66 IN | DIASTOLIC BLOOD PRESSURE: 75 MMHG | WEIGHT: 118.81 LBS

## 2022-11-04 DIAGNOSIS — F10.180 ALCOHOL ABUSE WITH ALCOHOL-INDUCED ANXIETY DISORDER: ICD-10-CM

## 2022-11-04 DIAGNOSIS — Z72.0 TOBACCO USE: ICD-10-CM

## 2022-11-04 DIAGNOSIS — F41.9 ANXIETY: ICD-10-CM

## 2022-11-04 DIAGNOSIS — R41.9 UNSPECIFIED SYMPTOMS AND SIGNS INVOLVING COGNITIVE FUNCTIONS AND AWARENESS: ICD-10-CM

## 2022-11-04 DIAGNOSIS — R25.1 TREMOR: ICD-10-CM

## 2022-11-04 DIAGNOSIS — R25.1 TREMOR: Primary | ICD-10-CM

## 2022-11-04 PROBLEM — F31.9 BIPOLAR 1 DISORDER: Status: RESOLVED | Noted: 2019-10-11 | Resolved: 2022-11-04

## 2022-11-04 LAB
ALBUMIN SERPL BCP-MCNC: 4.3 G/DL (ref 3.5–5.2)
ALP SERPL-CCNC: 129 U/L (ref 55–135)
ALT SERPL W/O P-5'-P-CCNC: 19 U/L (ref 10–44)
ANION GAP SERPL CALC-SCNC: 16 MMOL/L (ref 8–16)
AST SERPL-CCNC: 23 U/L (ref 10–40)
BASOPHILS # BLD AUTO: 0.03 K/UL (ref 0–0.2)
BASOPHILS NFR BLD: 0.4 % (ref 0–1.9)
BILIRUB SERPL-MCNC: 0.9 MG/DL (ref 0.1–1)
BUN SERPL-MCNC: 9 MG/DL (ref 6–20)
CALCIUM SERPL-MCNC: 10.5 MG/DL (ref 8.7–10.5)
CHLORIDE SERPL-SCNC: 102 MMOL/L (ref 95–110)
CO2 SERPL-SCNC: 22 MMOL/L (ref 23–29)
CREAT SERPL-MCNC: 0.7 MG/DL (ref 0.5–1.4)
DIFFERENTIAL METHOD: ABNORMAL
EOSINOPHIL # BLD AUTO: 0.1 K/UL (ref 0–0.5)
EOSINOPHIL NFR BLD: 0.9 % (ref 0–8)
ERYTHROCYTE [DISTWIDTH] IN BLOOD BY AUTOMATED COUNT: 13.5 % (ref 11.5–14.5)
EST. GFR  (NO RACE VARIABLE): >60 ML/MIN/1.73 M^2
GLUCOSE SERPL-MCNC: 94 MG/DL (ref 70–110)
HCT VFR BLD AUTO: 45.2 % (ref 37–48.5)
HGB BLD-MCNC: 15 G/DL (ref 12–16)
IMM GRANULOCYTES # BLD AUTO: 0.02 K/UL (ref 0–0.04)
IMM GRANULOCYTES NFR BLD AUTO: 0.2 % (ref 0–0.5)
LYMPHOCYTES # BLD AUTO: 2.1 K/UL (ref 1–4.8)
LYMPHOCYTES NFR BLD: 26.3 % (ref 18–48)
MCH RBC QN AUTO: 31.7 PG (ref 27–31)
MCHC RBC AUTO-ENTMCNC: 33.2 G/DL (ref 32–36)
MCV RBC AUTO: 96 FL (ref 82–98)
MONOCYTES # BLD AUTO: 0.7 K/UL (ref 0.3–1)
MONOCYTES NFR BLD: 8.8 % (ref 4–15)
NEUTROPHILS # BLD AUTO: 5.2 K/UL (ref 1.8–7.7)
NEUTROPHILS NFR BLD: 63.4 % (ref 38–73)
NRBC BLD-RTO: 0 /100 WBC
PLATELET # BLD AUTO: 193 K/UL (ref 150–450)
PMV BLD AUTO: 12 FL (ref 9.2–12.9)
POTASSIUM SERPL-SCNC: 5.2 MMOL/L (ref 3.5–5.1)
PROT SERPL-MCNC: 7.4 G/DL (ref 6–8.4)
RBC # BLD AUTO: 4.73 M/UL (ref 4–5.4)
SODIUM SERPL-SCNC: 140 MMOL/L (ref 136–145)
T4 FREE SERPL-MCNC: 0.78 NG/DL (ref 0.71–1.51)
TSH SERPL DL<=0.005 MIU/L-ACNC: 0.22 UIU/ML (ref 0.4–4)
VIT B12 SERPL-MCNC: 400 PG/ML (ref 210–950)
WBC # BLD AUTO: 8.15 K/UL (ref 3.9–12.7)

## 2022-11-04 PROCEDURE — 84443 ASSAY THYROID STIM HORMONE: CPT | Performed by: NURSE PRACTITIONER

## 2022-11-04 PROCEDURE — 99499 UNLISTED E&M SERVICE: CPT | Mod: S$GLB,,, | Performed by: NURSE PRACTITIONER

## 2022-11-04 PROCEDURE — 1159F PR MEDICATION LIST DOCUMENTED IN MEDICAL RECORD: ICD-10-PCS | Mod: CPTII,S$GLB,, | Performed by: NURSE PRACTITIONER

## 2022-11-04 PROCEDURE — 85025 COMPLETE CBC W/AUTO DIFF WBC: CPT | Performed by: NURSE PRACTITIONER

## 2022-11-04 PROCEDURE — 3077F PR MOST RECENT SYSTOLIC BLOOD PRESSURE >= 140 MM HG: ICD-10-PCS | Mod: CPTII,S$GLB,, | Performed by: NURSE PRACTITIONER

## 2022-11-04 PROCEDURE — 36415 COLL VENOUS BLD VENIPUNCTURE: CPT | Mod: PO | Performed by: NURSE PRACTITIONER

## 2022-11-04 PROCEDURE — 99204 PR OFFICE/OUTPT VISIT, NEW, LEVL IV, 45-59 MIN: ICD-10-PCS | Mod: S$GLB,,, | Performed by: NURSE PRACTITIONER

## 2022-11-04 PROCEDURE — 99204 OFFICE O/P NEW MOD 45 MIN: CPT | Mod: S$GLB,,, | Performed by: NURSE PRACTITIONER

## 2022-11-04 PROCEDURE — 99999 PR PBB SHADOW E&M-EST. PATIENT-LVL V: ICD-10-PCS | Mod: PBBFAC,,, | Performed by: NURSE PRACTITIONER

## 2022-11-04 PROCEDURE — 99999 PR PBB SHADOW E&M-EST. PATIENT-LVL V: CPT | Mod: PBBFAC,,, | Performed by: NURSE PRACTITIONER

## 2022-11-04 PROCEDURE — 1160F RVW MEDS BY RX/DR IN RCRD: CPT | Mod: CPTII,S$GLB,, | Performed by: NURSE PRACTITIONER

## 2022-11-04 PROCEDURE — 80053 COMPREHEN METABOLIC PANEL: CPT | Performed by: NURSE PRACTITIONER

## 2022-11-04 PROCEDURE — 3077F SYST BP >= 140 MM HG: CPT | Mod: CPTII,S$GLB,, | Performed by: NURSE PRACTITIONER

## 2022-11-04 PROCEDURE — 1159F MED LIST DOCD IN RCRD: CPT | Mod: CPTII,S$GLB,, | Performed by: NURSE PRACTITIONER

## 2022-11-04 PROCEDURE — 99499 RISK ADDL DX/OHS AUDIT: ICD-10-PCS | Mod: S$GLB,,, | Performed by: NURSE PRACTITIONER

## 2022-11-04 PROCEDURE — 82525 ASSAY OF COPPER: CPT | Performed by: NURSE PRACTITIONER

## 2022-11-04 PROCEDURE — 3008F BODY MASS INDEX DOCD: CPT | Mod: CPTII,S$GLB,, | Performed by: NURSE PRACTITIONER

## 2022-11-04 PROCEDURE — 82607 VITAMIN B-12: CPT | Performed by: NURSE PRACTITIONER

## 2022-11-04 PROCEDURE — 3078F PR MOST RECENT DIASTOLIC BLOOD PRESSURE < 80 MM HG: ICD-10-PCS | Mod: CPTII,S$GLB,, | Performed by: NURSE PRACTITIONER

## 2022-11-04 PROCEDURE — 3008F PR BODY MASS INDEX (BMI) DOCUMENTED: ICD-10-PCS | Mod: CPTII,S$GLB,, | Performed by: NURSE PRACTITIONER

## 2022-11-04 PROCEDURE — 84425 ASSAY OF VITAMIN B-1: CPT | Performed by: NURSE PRACTITIONER

## 2022-11-04 PROCEDURE — 1160F PR REVIEW ALL MEDS BY PRESCRIBER/CLIN PHARMACIST DOCUMENTED: ICD-10-PCS | Mod: CPTII,S$GLB,, | Performed by: NURSE PRACTITIONER

## 2022-11-04 PROCEDURE — 84439 ASSAY OF FREE THYROXINE: CPT | Performed by: NURSE PRACTITIONER

## 2022-11-04 PROCEDURE — 3078F DIAST BP <80 MM HG: CPT | Mod: CPTII,S$GLB,, | Performed by: NURSE PRACTITIONER

## 2022-11-04 RX ORDER — MELOXICAM 15 MG/1
15 TABLET ORAL DAILY PRN
COMMUNITY
Start: 2022-09-19

## 2022-11-04 RX ORDER — LORATADINE 10 MG/1
10 TABLET ORAL DAILY
COMMUNITY

## 2022-11-04 RX ORDER — PROPRANOLOL HYDROCHLORIDE 20 MG/1
20 TABLET ORAL 2 TIMES DAILY
Qty: 60 TABLET | Refills: 1 | Status: SHIPPED | OUTPATIENT
Start: 2022-11-04 | End: 2022-12-02

## 2022-11-04 NOTE — ASSESSMENT & PLAN NOTE
Pt drinks 3-4 alcoholic drinks 3-4 times/week in order to calm herself down  Referral to psychiatry

## 2022-11-04 NOTE — ASSESSMENT & PLAN NOTE
Patient is 60 y/o female that presents with complaints of tremor. She reports a bilateral hand tremor that occurs at rest and with action.   She also has a noticeable vocal tremor that has been ongoing for the 1.5-2 years.    - there is associated hoarseness but she denies pain or difficulty swallowing   - She was previously evaluated buy ENT in 2019 and recommended that she stop smoking as her w/u was essentially unrevealing. It was suggested she see ENT back but she never followed up.   There was no tremor appreciated on neuro exam or PD features   - likely that her reported hand tremors are mood related  Suspect ET as her sister and brother also have a tremor  Obtain serologies  Discussed role of gold standard medication with the patient at length. She is interested in starting a medication   - start propranolol 20 mg BID for now with intentions to increase  Would like the pt to follow back up with ENT to rule out other etiology    - botox to be considered?

## 2022-11-04 NOTE — ASSESSMENT & PLAN NOTE
"Pt reports ongoing immense anxiety  Reports thinking "horrible thoughts" at all times  Referral to psychiatry for medical management   "

## 2022-11-04 NOTE — PROGRESS NOTES
"NEUROLOGY  Outpatient CONSULT    [facility]  [address], [place] [address]  [phone] (office) / [phone] (fax)    Patient Name:  Lizbet Cooley  :  1960  MR #:  [id number]  Acct #:  [id number]    Date of Neurology Consult: 2022  Name of Provider: DEBBY Wagner    Other Physicians:  Lemuel Vaughan MD (Primary Care Physician); Beverly Cardenas PA-C (Referring)      Chief Complaint: Tremors      History of Present Illness (HPI):  Lizbet Cooley is a right handed 61 y.o. female with a PMHX of anxiety, bipolar disorder, COPD, osteoporosis  Patient is here today for tremor. It is a vocal tremor and has been going on for the last year and a half. She denies throat pain or swallowing difficulty. She did see ENT back in 2021 for hoarseness. A scope was done and reported to be unrevealing. She was told to quit smoking but has yet to do this. She smokes about 1 pack per day. She wants to quit.   She also endorses a hand tremor that started "years ago". She reports it to be to both hands and occurs at rest and when grabbing for items. It is not constant but occurs daily. The vocal tremor is constant.     Patient does suffer severe anxiety and was previously referred to psychiatry but has never seen them. She reports thinking "horrible thoughts" about bad things happening to her family. She denies suicidal ideation. She was previously prescribed Zyprexa years ago for sleep but reports only taking it about 1 week.     She reports that her sister and brother both have a hand tremor. She drinks about 3-4 beers (12 oz) at least 3-4 nights/week. She does this to help her calm down and states it does help with her tremors. She drinks 1 cup of coffee (8oz) every morning and water throughout the day. She reports that her writing is shaky.           Past Medical, Surgical, Family & Social History:   Past Medical History:   Diagnosis Date    Anxiety     Bipolar disorder  "    COPD (chronic obstructive pulmonary disease)     Osteoporosis     Trimalleolar fracture of ankle, closed, left, initial encounter 11/14/2019     Past Surgical History:   Procedure Laterality Date    BREAST BIOPSY      HYSTERECTOMY       Family History   Problem Relation Age of Onset    Alcohol abuse Sister     Drug abuse Son      Alcohol use:  reports current alcohol use of about 2.0 standard drinks per week.   (Of note, 0.6 oz = 1 beer or 6 oz = 10 beers).  Tobacco use:  reports that she has been smoking cigarettes. She has been smoking an average of 1 pack per day. She has never used smokeless tobacco.  Street drug use:  reports that she does not currently use drugs.  Allergies: Patient has no known allergies..    Home Medications:     Current Outpatient Medications:     albuterol (PROVENTIL/VENTOLIN HFA) 90 mcg/actuation inhaler, Inhale 2 puffs into the lungs every 6 (six) hours as needed for Wheezing. Rescue, Disp: 18 g, Rfl: 1    budesonide-formoterol 80-4.5 mcg (SYMBICORT) 80-4.5 mcg/actuation HFAA, Inhale 2 puffs into the lungs 2 (two) times a day. Controller, Disp: 10.2 g, Rfl: 1    calcium-vitamin D3 (OS-DARIUSZ 500 + D3) 500 mg-5 mcg (200 unit) per tablet, Take 1 tablet by mouth once daily., Disp: , Rfl:     loratadine (CLARITIN) 10 mg tablet, Take 10 mg by mouth once daily., Disp: , Rfl:     meloxicam (MOBIC) 15 MG tablet, Take 15 mg by mouth daily as needed., Disp: , Rfl:     mv,Ca,min-folic acid-vit K1 400-20 mcg Tab, Take 1 tablet by mouth once daily., Disp: , Rfl:     oxyCODONE-acetaminophen (PERCOCET)  mg per tablet, TAKE 1 TABLET BY ORAL ROUTE 1   2 TIMES EVERY DAY AS NEEDED., Disp: , Rfl:     NARCAN 4 mg/actuation [place], PLEASE SEE ATTACHED FOR DETAILED DIRECTIONS, Disp: , Rfl:     OLANZapine (ZYPREXA) 10 MG tablet, Take 1 tablet (10 mg total) by mouth every evening. Start with 1/2 tab nightly for the first week (Patient not taking: Reported on 11/4/2022), Disp: 30 tablet, Rfl: 1    Physical  "Examination:  BP (!) 164/75 (BP Location: Left arm, Patient Position: Sitting, BP Method: Medium (Automatic))   Pulse 81   Ht 5' 6" (1.676 m)   Wt 53.9 kg (118 lb 13.3 oz)   BMI 19.18 kg/m²       GENERAL:  General appearance: Well, non-toxic appearing.  No apparent distress.  Neck: supple.  .    MENTAL STATUS:  Alertness, attention span & concentration: normal.  Language: normal.  Orientation to self, place & time:  normal.  Memory, recent & remote: normal.  Fund of knowledge: normal.      SPEECH:  Vocal tremor noted  Follows complex commands.      CRANIAL NERVES:  Cranial Nerves II-XII were examined.  II - Visual fields: normal.  III, IV, VI: PERRL, EOMI, No ptosis, No nystagmus.  V - Facial sensation: normal.  VII - Face symmetry & mobility: normal.  VIII - Hearing: normal  IX, X - Palate: mobile & midline.  XI - Shoulder shrug: normal.  XII - Tongue protrusion: normal.        GROSS MOTOR:  Gait & station: non focal; good arm swing and step height; good tandem  Tone: normal.  Arms to core:  Arms extended:no tremor  Abnormal movements: none.  Finger-nose: normal.  Rapid alternating movements: normal.  Pronator drift: normal      MUSCLE STRENGTH:   Hand grasp:   - right:5/5   - left:5/5    Fascics Atrophy RIGHT    LEFT Atrophy Fascics     5 Deltoids 5       5 Sh.Ext.Rot. 5       5 Sh.Int.Rot. 5       5 Biceps 5       5 Triceps 5       5 Forearm.Pr. 5                5 Iliopsoas flex    5       5 Hip Abduct 5       5 Hip Adduct 5       5 Quads 5       5 Hams 5       5 Dorsiflex 5       5 Plantar Flex 5       REFLEXES:    RIGHT Reflex   LEFT   2 Biceps 2   2 Brachiorad. 2        2 Patellar 2     SENSORY:  Light touch: Normal throughout.         Diagnostic Data Reviewed:     CT neck 2021:  FINDINGS:  Skull Base and Brain (Limited evaluation): No significant abnormalities. Orbits are unremarkable.     Sinus and air cells: Scattered mild mucosal thickening within the paranasal sinuses.  Mastoid air cells are " essentially clear.     Parotid and submandibular glands: Within normal limits.  No sialolithiasis.     Thyroid: Normal in size and attenuation.  Several scattered subcentimeter hypodense thyroid nodules are visualized within the right thyroid lobe.  No follow-up is recommended.     Lymph nodes: Scattered subcentimeter lymph nodes are seen in the neck.  None are pathologically enlarged or abnormally enhancing.     Larynx/ Pharynx: Unremarkable, with preserved fat planes.     Vasculature: Mild calcific atherosclerosis without hemodynamically significant stenosis.     Visualized airways and lungs: Airways are patent.  Paraseptal emphysematous changes.  Biapical nonspecific fibronodular pleuroparenchymal scarring is evident.     Bones: Age-appropriate degeneration.  No acute fracture.     Soft tissues: No significant abnormality.     Impression:  1. No significant soft tissue abnormality to explain the patient's symptoms.  2. No cervical lymphadenopathy.  3. Additional findings, as above.            Assessment and Plan:  Lizbet Cooley is a 61 y.o. female.    Problem List Items Addressed This Visit          Neuro    Tremor - Primary    Current Assessment & Plan     Patient is 60 y/o female that presents with complaints of tremor. She reports a bilateral hand tremor that occurs at rest and with action.   She also has a noticeable vocal tremor that has been ongoing for the 1.5-2 years.    - there is associated hoarseness but she denies pain or difficulty swallowing   - She was previously evaluated buy ENT in 2019 and recommended that she stop smoking as her w/u was essentially unrevealing. It was suggested she see ENT back but she never followed up.   There was no tremor appreciated on neuro exam or PD features   - likely that her reported hand tremors are mood related  Suspect ET as her sister and brother also have a tremor  Obtain serologies  Discussed role of gold standard medication with the patient at length. She is  "interested in starting a medication   - start propranolol 20 mg BID for now with intentions to increase  Would like the pt to follow back up with ENT to rule out other etiology    - botox to be considered?            Psychiatric    Alcohol abuse with alcohol-induced anxiety disorder    Current Assessment & Plan     Pt drinks 3-4 alcoholic drinks 3-4 times/week in order to calm herself down  Referral to psychiatry         Anxiety    Current Assessment & Plan     Pt reports ongoing immense anxiety  Reports thinking "horrible thoughts" at all times  Referral to psychiatry for medical management             Other    Tobacco use    Current Assessment & Plan     Vascular risk factor  Smokes ~ 1 ppd  Smoking cessation discussed.             Other Visit Diagnoses       Unspecified symptoms and signs involving cognitive functions and awareness                                Important to note, also  has a past medical history of Anxiety, Bipolar disorder, COPD (chronic obstructive pulmonary disease), Osteoporosis, and Trimalleolar fracture of ankle, closed, left, initial encounter (11/14/2019).            The patient will return to clinic in 4 weeks        All questions were answered and patient is comfortable with the plan.       Thank you very much for the opportunity to assist in this patient's care.    If you have any questions or concerns, please do not hesitate to contact me at any time.    Sincerely,     DEBBY Wagner  [facility] [place]         I spent a total of 57 minutes on the day of the visit.This includes face to face time and non-face to face time preparing to see the patient (eg, review of tests), Obtaining and/or reviewing separately obtained history, Documenting clinical information in the electronic or other health record, Independently interpreting resultsand communicating results to the patient/family/caregiver, or Care coordination.      "

## 2022-11-08 LAB
COPPER SERPL-MCNC: 919 UG/L (ref 810–1990)
VIT B1 BLD-MCNC: 96 UG/L (ref 38–122)

## 2023-05-08 ENCOUNTER — NURSE TRIAGE (OUTPATIENT)
Dept: ADMINISTRATIVE | Facility: CLINIC | Age: 63
End: 2023-05-08
Payer: MEDICARE

## 2023-05-08 ENCOUNTER — TELEPHONE (OUTPATIENT)
Dept: FAMILY MEDICINE | Facility: CLINIC | Age: 63
End: 2023-05-08
Payer: MEDICARE

## 2023-05-08 NOTE — TELEPHONE ENCOUNTER
Pts sister calling with pt, states that pt was seen recently at [facility] r/t SOB and was given antibiotics and inhaler. Reports it has gotten worse not better at all. Endorses severe difficulty breathing and speaking in single words. Per protocol advised to CALL 911 NOW. verbalized understanding. Denies any further questions or concerns at this time, advised to call back if they have any that come up. Advised pt to call back with any other concerns or worsening symptoms. Verbalized understanding and will route message to provider.     Reason for Disposition   SEVERE difficulty breathing (e.g., struggling for each breath, speaks in single words, pulse > 120)    Protocols used: Breathing Difficulty-A-[place]

## 2023-05-08 NOTE — TELEPHONE ENCOUNTER
----- Message from Dangelo Reese sent at 5/8/2023  3:51 PM CDT -----  Contact: pt at [phone]  Type:  Sooner Appointment Request    Caller is requesting a sooner appointment.  Caller declined first available appointment listed below.  Caller will not accept being placed on the waitlist and is requesting a message be sent to doctor.    Name of Caller:  pt  When is the first available appointment?  6/9  Symptoms:  trouble breathing pain in back  Best Call Back Number:  [phone]  Additional Information:  pt is calling the office to schedule an appt due to her having trouble breathing pain in back and the date of 6/9 came up she states she needs to be seen this  week after her visit to the ER today. Please call back and advise.

## 2023-05-10 ENCOUNTER — OFFICE VISIT (OUTPATIENT)
Dept: FAMILY MEDICINE | Facility: CLINIC | Age: 63
End: 2023-05-10
Payer: MEDICARE

## 2023-05-10 ENCOUNTER — HOSPITAL ENCOUNTER (OUTPATIENT)
Dept: RADIOLOGY | Facility: HOSPITAL | Age: 63
Discharge: HOME | End: 2023-05-10
Attending: STUDENT IN AN ORGANIZED HEALTH CARE EDUCATION/TRAINING PROGRAM
Payer: MEDICARE

## 2023-05-10 VITALS
DIASTOLIC BLOOD PRESSURE: 88 MMHG | OXYGEN SATURATION: 97 % | HEART RATE: 86 BPM | WEIGHT: 118.63 LBS | SYSTOLIC BLOOD PRESSURE: 152 MMHG | HEIGHT: 66 IN | BODY MASS INDEX: 19.07 KG/M2

## 2023-05-10 DIAGNOSIS — R05.1 ACUTE COUGH: Primary | ICD-10-CM

## 2023-05-10 DIAGNOSIS — R05.1 ACUTE COUGH: ICD-10-CM

## 2023-05-10 DIAGNOSIS — M54.50 ACUTE BILATERAL LOW BACK PAIN WITHOUT SCIATICA: ICD-10-CM

## 2023-05-10 DIAGNOSIS — M80.00XA OSTEOPOROSIS WITH CURRENT PATHOLOGICAL FRACTURE, UNSPECIFIED OSTEOPOROSIS TYPE, INITIAL ENCOUNTER: ICD-10-CM

## 2023-05-10 PROCEDURE — 1159F MED LIST DOCD IN RCRD: CPT | Mod: CPTII,S$GLB,, | Performed by: STUDENT IN AN ORGANIZED HEALTH CARE EDUCATION/TRAINING PROGRAM

## 2023-05-10 PROCEDURE — 3008F BODY MASS INDEX DOCD: CPT | Mod: CPTII,S$GLB,, | Performed by: STUDENT IN AN ORGANIZED HEALTH CARE EDUCATION/TRAINING PROGRAM

## 2023-05-10 PROCEDURE — 71046 X-RAY EXAM CHEST 2 VIEWS: CPT | Mod: TC,FY,PO

## 2023-05-10 PROCEDURE — 71046 XR CHEST PA AND LATERAL: ICD-10-PCS | Mod: 26,,, | Performed by: RADIOLOGY

## 2023-05-10 PROCEDURE — 3079F DIAST BP 80-89 MM HG: CPT | Mod: CPTII,S$GLB,, | Performed by: STUDENT IN AN ORGANIZED HEALTH CARE EDUCATION/TRAINING PROGRAM

## 2023-05-10 PROCEDURE — 99213 PR OFFICE/OUTPT VISIT, EST, LEVL III, 20-29 MIN: ICD-10-PCS | Mod: S$GLB,,, | Performed by: STUDENT IN AN ORGANIZED HEALTH CARE EDUCATION/TRAINING PROGRAM

## 2023-05-10 PROCEDURE — 3077F SYST BP >= 140 MM HG: CPT | Mod: CPTII,S$GLB,, | Performed by: STUDENT IN AN ORGANIZED HEALTH CARE EDUCATION/TRAINING PROGRAM

## 2023-05-10 PROCEDURE — 99999 PR PBB SHADOW E&M-EST. PATIENT-LVL III: ICD-10-PCS | Mod: PBBFAC,,, | Performed by: STUDENT IN AN ORGANIZED HEALTH CARE EDUCATION/TRAINING PROGRAM

## 2023-05-10 PROCEDURE — 3079F PR MOST RECENT DIASTOLIC BLOOD PRESSURE 80-89 MM HG: ICD-10-PCS | Mod: CPTII,S$GLB,, | Performed by: STUDENT IN AN ORGANIZED HEALTH CARE EDUCATION/TRAINING PROGRAM

## 2023-05-10 PROCEDURE — 99213 OFFICE O/P EST LOW 20 MIN: CPT | Mod: S$GLB,,, | Performed by: STUDENT IN AN ORGANIZED HEALTH CARE EDUCATION/TRAINING PROGRAM

## 2023-05-10 PROCEDURE — 3008F PR BODY MASS INDEX (BMI) DOCUMENTED: ICD-10-PCS | Mod: CPTII,S$GLB,, | Performed by: STUDENT IN AN ORGANIZED HEALTH CARE EDUCATION/TRAINING PROGRAM

## 2023-05-10 PROCEDURE — 71046 X-RAY EXAM CHEST 2 VIEWS: CPT | Mod: 26,,, | Performed by: RADIOLOGY

## 2023-05-10 PROCEDURE — 3077F PR MOST RECENT SYSTOLIC BLOOD PRESSURE >= 140 MM HG: ICD-10-PCS | Mod: CPTII,S$GLB,, | Performed by: STUDENT IN AN ORGANIZED HEALTH CARE EDUCATION/TRAINING PROGRAM

## 2023-05-10 PROCEDURE — 1159F PR MEDICATION LIST DOCUMENTED IN MEDICAL RECORD: ICD-10-PCS | Mod: CPTII,S$GLB,, | Performed by: STUDENT IN AN ORGANIZED HEALTH CARE EDUCATION/TRAINING PROGRAM

## 2023-05-10 PROCEDURE — 99999 PR PBB SHADOW E&M-EST. PATIENT-LVL III: CPT | Mod: PBBFAC,,, | Performed by: STUDENT IN AN ORGANIZED HEALTH CARE EDUCATION/TRAINING PROGRAM

## 2023-05-10 RX ORDER — ALENDRONATE SODIUM 70 MG/1
70 TABLET ORAL
Qty: 4 TABLET | Refills: 11 | Status: SHIPPED | OUTPATIENT
Start: 2023-05-10 | End: 2023-11-16 | Stop reason: SDUPTHER

## 2023-05-10 RX ORDER — PROMETHAZINE HYDROCHLORIDE AND DEXTROMETHORPHAN HYDROBROMIDE 6.25; 15 MG/5ML; MG/5ML
5 SYRUP ORAL EVERY 4 HOURS PRN
Qty: 240 ML | Refills: 1 | Status: SHIPPED | OUTPATIENT
Start: 2023-05-10 | End: 2023-05-20

## 2023-05-10 NOTE — ASSESSMENT & PLAN NOTE
Poorly controlled. Not concerned about COPD exacerbation as she had a steroid course that did not resolve issue. I would like to repeat CXR - it's possible she was too early in her course for the pneumonia to appear. Will also look at lumbar spine given back pain. Further imaging pending results of xray.

## 2023-05-10 NOTE — PROGRESS NOTES
Name: Lizbet Cooley  MRN: [id number]  : 1960  PCP: Lemuel Vaughan MD    HPI  Patient presents with chest and back symptoms for a week. Presented to urgent care - Xray reportedly normal. Gave her Augmentin and prednisone. Has not noticed any improvement. She has been taking her normal pain medications - Percocet, Tylenol. Has not been taking NSAID. Stabbing back pain hurts when she leans forward.     Review of Systems   Constitutional:  Positive for diaphoresis. Negative for chills and fever.   Respiratory:  Positive for cough (nonproductive) and shortness of breath (COPD at baseline but has worsened with this illness).    Gastrointestinal:  Negative for nausea and vomiting.   Musculoskeletal:  Positive for back pain.     Patient Active Problem List   Diagnosis    Chronic obstructive pulmonary disease    Adjustment reaction with anxiety    Osteoporosis    Alcohol abuse with alcohol-induced anxiety disorder    Tobacco use    Chronic pain syndrome    Chronic prescription opiate use    Lung nodule seen on imaging study    Change in voice    Postmenopausal    Anxiety    Tremor       Vitals:    05/10/23 1004   BP: (!) 152/88   Pulse: 86       Physical Exam  Constitutional:       General: She is not in acute distress.     Appearance: Normal appearance. She is well-developed.   HENT:      Head: Normocephalic and atraumatic.      Right Ear: External ear normal.      Left Ear: External ear normal.   Eyes:      Conjunctiva/sclera: Conjunctivae normal.   Cardiovascular:      Rate and Rhythm: Normal rate and regular rhythm.      Heart sounds: No murmur heard.    No friction rub. No gallop.   Pulmonary:      Effort: Pulmonary effort is normal. No respiratory distress.      Breath sounds: No wheezing, rhonchi or rales.   Abdominal:      General: Abdomen is flat. There is no distension.   Musculoskeletal:         General: No swelling or deformity.      Thoracic back: Tenderness (Lower thoracic/upper lumbar) present.       Right lower leg: No edema.      Left lower leg: No edema.   Skin:     General: Skin is warm and dry.      Coloration: Skin is not jaundiced.   Neurological:      Mental Status: She is alert and oriented to person, place, and time. Mental status is at baseline.   Psychiatric:         Attention and Perception: Attention and perception normal.         Mood and Affect: Mood normal.         Speech: Speech normal.         Behavior: Behavior normal. Behavior is cooperative.         Thought Content: Thought content normal.         Cognition and Memory: Cognition normal.         Judgment: Judgment normal.       1. Acute cough  Assessment & Plan:  Poorly controlled. Not concerned about COPD exacerbation as she had a steroid course that did not resolve issue. I would like to repeat CXR - it's possible she was too early in her course for the pneumonia to appear. Will also look at lumbar spine given back pain. Further imaging pending results of xray.    Orders:  -     X-Ray Chest PA And Lateral; Future; Expected date: 05/10/2023  -     promethazine-dextromethorphan (PROMETHAZINE-DM) 6.25-15 mg/5 mL Syrp; Take 5 mLs by mouth every 4 (four) hours as needed (cough).  Dispense: 240 mL; Refill: 1    2. Acute bilateral low back pain without sciatica  Assessment & Plan:  Poorly controlled. I recommended she take her meloxicam prescription to help with pain.    Orders:  -     X-Ray Lumbar Spine AP And Lateral; Future; Expected date: 05/10/2023        Call sister Tejal with results at [phone] as patient's phone is currently broken.     Follow up as needed.    Tulio Quintero MD  05/10/2023

## 2023-08-22 ENCOUNTER — PES CALL (OUTPATIENT)
Dept: ADMINISTRATIVE | Facility: CLINIC | Age: 63
End: 2023-08-22
Payer: MEDICARE

## 2023-11-16 ENCOUNTER — OFFICE VISIT (OUTPATIENT)
Dept: FAMILY MEDICINE | Facility: CLINIC | Age: 63
End: 2023-11-16
Payer: MEDICARE

## 2023-11-16 VITALS
OXYGEN SATURATION: 98 % | HEART RATE: 88 BPM | BODY MASS INDEX: 18.06 KG/M2 | WEIGHT: 111.88 LBS | SYSTOLIC BLOOD PRESSURE: 140 MMHG | DIASTOLIC BLOOD PRESSURE: 84 MMHG

## 2023-11-16 DIAGNOSIS — F31.9 BIPOLAR 1 DISORDER: ICD-10-CM

## 2023-11-16 DIAGNOSIS — J44.9 CHRONIC OBSTRUCTIVE PULMONARY DISEASE, UNSPECIFIED COPD TYPE: ICD-10-CM

## 2023-11-16 DIAGNOSIS — M80.00XA OSTEOPOROSIS WITH CURRENT PATHOLOGICAL FRACTURE, UNSPECIFIED OSTEOPOROSIS TYPE, INITIAL ENCOUNTER: ICD-10-CM

## 2023-11-16 PROCEDURE — 3008F PR BODY MASS INDEX (BMI) DOCUMENTED: ICD-10-PCS | Mod: HCNC,CPTII,S$GLB, | Performed by: INTERNAL MEDICINE

## 2023-11-16 PROCEDURE — 1159F MED LIST DOCD IN RCRD: CPT | Mod: HCNC,CPTII,S$GLB, | Performed by: INTERNAL MEDICINE

## 2023-11-16 PROCEDURE — 3079F DIAST BP 80-89 MM HG: CPT | Mod: HCNC,CPTII,S$GLB, | Performed by: INTERNAL MEDICINE

## 2023-11-16 PROCEDURE — 3077F PR MOST RECENT SYSTOLIC BLOOD PRESSURE >= 140 MM HG: ICD-10-PCS | Mod: HCNC,CPTII,S$GLB, | Performed by: INTERNAL MEDICINE

## 2023-11-16 PROCEDURE — 99999 PR PBB SHADOW E&M-EST. PATIENT-LVL III: ICD-10-PCS | Mod: PBBFAC,HCNC,, | Performed by: INTERNAL MEDICINE

## 2023-11-16 PROCEDURE — 99214 PR OFFICE/OUTPT VISIT, EST, LEVL IV, 30-39 MIN: ICD-10-PCS | Mod: HCNC,S$GLB,, | Performed by: INTERNAL MEDICINE

## 2023-11-16 PROCEDURE — 99214 OFFICE O/P EST MOD 30 MIN: CPT | Mod: HCNC,S$GLB,, | Performed by: INTERNAL MEDICINE

## 2023-11-16 PROCEDURE — 1160F RVW MEDS BY RX/DR IN RCRD: CPT | Mod: HCNC,CPTII,S$GLB, | Performed by: INTERNAL MEDICINE

## 2023-11-16 PROCEDURE — 1159F PR MEDICATION LIST DOCUMENTED IN MEDICAL RECORD: ICD-10-PCS | Mod: HCNC,CPTII,S$GLB, | Performed by: INTERNAL MEDICINE

## 2023-11-16 PROCEDURE — 3077F SYST BP >= 140 MM HG: CPT | Mod: HCNC,CPTII,S$GLB, | Performed by: INTERNAL MEDICINE

## 2023-11-16 PROCEDURE — 3079F PR MOST RECENT DIASTOLIC BLOOD PRESSURE 80-89 MM HG: ICD-10-PCS | Mod: HCNC,CPTII,S$GLB, | Performed by: INTERNAL MEDICINE

## 2023-11-16 PROCEDURE — 1160F PR REVIEW ALL MEDS BY PRESCRIBER/CLIN PHARMACIST DOCUMENTED: ICD-10-PCS | Mod: HCNC,CPTII,S$GLB, | Performed by: INTERNAL MEDICINE

## 2023-11-16 PROCEDURE — 3008F BODY MASS INDEX DOCD: CPT | Mod: HCNC,CPTII,S$GLB, | Performed by: INTERNAL MEDICINE

## 2023-11-16 PROCEDURE — 99999 PR PBB SHADOW E&M-EST. PATIENT-LVL III: CPT | Mod: PBBFAC,HCNC,, | Performed by: INTERNAL MEDICINE

## 2023-11-16 RX ORDER — BUDESONIDE AND FORMOTEROL FUMARATE DIHYDRATE 80; 4.5 UG/1; UG/1
2 AEROSOL RESPIRATORY (INHALATION) 2 TIMES DAILY
Qty: 10.2 G | Refills: 1 | Status: SHIPPED | OUTPATIENT
Start: 2023-11-16 | End: 2024-01-12

## 2023-11-16 RX ORDER — ALBUTEROL SULFATE 90 UG/1
2 AEROSOL, METERED RESPIRATORY (INHALATION) EVERY 6 HOURS PRN
Qty: 18 G | Refills: 0 | Status: SHIPPED | OUTPATIENT
Start: 2023-11-16 | End: 2024-01-12

## 2023-11-16 RX ORDER — ALENDRONATE SODIUM 70 MG/1
70 TABLET ORAL
Qty: 4 TABLET | Refills: 0 | Status: SHIPPED | OUTPATIENT
Start: 2023-11-16 | End: 2024-01-17

## 2023-11-16 RX ORDER — OLANZAPINE 10 MG/1
10 TABLET ORAL NIGHTLY
Qty: 30 TABLET | Refills: 1 | Status: SHIPPED | OUTPATIENT
Start: 2023-11-16 | End: 2024-01-10 | Stop reason: SDUPTHER

## 2023-11-16 NOTE — PROGRESS NOTES
Subjective     Lizbet Cooley is a 63 y.o. old, female here for Medication Refill, Follow-up, and Insomnia    Patient is here for med refills.  She last saw Dr. Quintero in May.  She is a poor historian but has a history of COPD, BPD, osteoporosis. She has not been compliant with medications. She has a chronic cough and continues to smoke. Several significant life stressors and family. Constant racing thoughts, chronic insomnia, depressed mood. Denies SI.  She has been taking fosamax. It has been recommended to her to est with psychiatry and take olanzapine in the past.  She is on COT.    Review of Systems   Constitutional:  Positive for malaise/fatigue.   Respiratory:  Positive for cough, shortness of breath and wheezing.    Psychiatric/Behavioral:  Positive for depression and memory loss. The patient is nervous/anxious and has insomnia.      Medications     Outpatient Medications Marked as Taking for the 11/16/23 encounter (Office Visit) with Lemuel Vaughan MD   Medication Sig Dispense Refill    calcium-vitamin D3 (OS-DARIUSZ 500 + D3) 500 mg-5 mcg (200 unit) per tablet Take 1 tablet by mouth once daily.      loratadine (CLARITIN) 10 mg tablet Take 10 mg by mouth once daily.      meloxicam (MOBIC) 15 MG tablet Take 15 mg by mouth daily as needed.      mv,Ca,min-folic acid-vit K1 400-20 mcg Tab Take 1 tablet by mouth once daily.      NARCAN 4 mg/actuation [place] PLEASE SEE ATTACHED FOR DETAILED DIRECTIONS      oxyCODONE-acetaminophen (PERCOCET)  mg per tablet TAKE 1 TABLET BY ORAL ROUTE 1   2 TIMES EVERY DAY AS NEEDED.      propranoloL (INDERAL) 20 MG tablet TAKE 1 TABLET BY MOUTH TWICE A  tablet 1    [DISCONTINUED] albuterol (PROVENTIL/VENTOLIN HFA) 90 mcg/actuation inhaler Inhale 2 puffs into the lungs every 6 (six) hours as needed for Wheezing. Rescue 18 g 1    [DISCONTINUED] alendronate (FOSAMAX) 70 MG tablet Take 1 tablet (70 mg total) by mouth every 7 days. 4 tablet 11    [DISCONTINUED]  budesonide-formoterol 80-4.5 mcg (SYMBICORT) 80-4.5 mcg/actuation HFAA Inhale 2 puffs into the lungs 2 (two) times a day. Controller 10.2 g 1     Objective     BP (!) 140/84   Pulse 88   Wt 50.8 kg (111 lb 14.1 oz)   SpO2 98%   BMI 18.06 kg/m²   Physical Exam  Constitutional:       Appearance: Normal appearance.   HENT:      Mouth/Throat:      Comments: Hoarse sounding voice  Pulmonary:      Breath sounds: Wheezing present.   Neurological:      Mental Status: She is alert.       Assessment and Plan     Chronic obstructive pulmonary disease, unspecified COPD type  -     albuterol (PROVENTIL/VENTOLIN HFA) 90 mcg/actuation inhaler; Inhale 2 puffs into the lungs every 6 (six) hours as needed for Wheezing. Rescue  Dispense: 18 g; Refill: 0  -     budesonide-formoterol 80-4.5 mcg (SYMBICORT) 80-4.5 mcg/actuation HFAA; Inhale 2 puffs into the lungs 2 (two) times a day. Controller  Dispense: 10.2 g; Refill: 1    Osteoporosis with current pathological fracture, unspecified osteoporosis type, initial encounter  -     alendronate (FOSAMAX) 70 MG tablet; Take 1 tablet (70 mg total) by mouth every 7 days.  Dispense: 4 tablet; Refill: 0    Bipolar 1 disorder  -     OLANZapine (ZYPREXA) 10 MG tablet; Take 1 tablet (10 mg total) by mouth every evening. Start with 1/2 tab nightly for the first week  Dispense: 30 tablet; Refill: 1    Restart meds as above.  F/u 1 month with Dr. Quintero.  ___________________  Lemuel Vaughan MD  Internal Medicine and Pediatrics

## 2024-01-10 ENCOUNTER — OFFICE VISIT (OUTPATIENT)
Dept: FAMILY MEDICINE | Facility: CLINIC | Age: 64
End: 2024-01-10
Payer: MEDICARE

## 2024-01-10 VITALS
BODY MASS INDEX: 18.06 KG/M2 | OXYGEN SATURATION: 98 % | HEART RATE: 76 BPM | DIASTOLIC BLOOD PRESSURE: 78 MMHG | WEIGHT: 111.88 LBS | SYSTOLIC BLOOD PRESSURE: 134 MMHG

## 2024-01-10 DIAGNOSIS — Z12.31 BREAST CANCER SCREENING BY MAMMOGRAM: ICD-10-CM

## 2024-01-10 DIAGNOSIS — J44.9 CHRONIC OBSTRUCTIVE PULMONARY DISEASE, UNSPECIFIED COPD TYPE: ICD-10-CM

## 2024-01-10 DIAGNOSIS — F31.9 BIPOLAR 1 DISORDER: ICD-10-CM

## 2024-01-10 DIAGNOSIS — Z72.0 TOBACCO USE: Primary | ICD-10-CM

## 2024-01-10 DIAGNOSIS — F17.210 CIGARETTE NICOTINE DEPENDENCE WITHOUT COMPLICATION: ICD-10-CM

## 2024-01-10 DIAGNOSIS — F17.200 NEEDS SMOKING CESSATION EDUCATION: ICD-10-CM

## 2024-01-10 DIAGNOSIS — I70.0 AORTIC ATHEROSCLEROSIS: ICD-10-CM

## 2024-01-10 DIAGNOSIS — F41.9 ANXIETY: ICD-10-CM

## 2024-01-10 PROBLEM — M54.50 ACUTE BILATERAL LOW BACK PAIN WITHOUT SCIATICA: Status: RESOLVED | Noted: 2023-05-10 | Resolved: 2024-01-10

## 2024-01-10 PROBLEM — R05.1 ACUTE COUGH: Status: RESOLVED | Noted: 2023-05-10 | Resolved: 2024-01-10

## 2024-01-10 PROCEDURE — 3078F DIAST BP <80 MM HG: CPT | Mod: HCNC,CPTII,S$GLB, | Performed by: STUDENT IN AN ORGANIZED HEALTH CARE EDUCATION/TRAINING PROGRAM

## 2024-01-10 PROCEDURE — 99999 PR PBB SHADOW E&M-EST. PATIENT-LVL III: CPT | Mod: PBBFAC,HCNC,, | Performed by: STUDENT IN AN ORGANIZED HEALTH CARE EDUCATION/TRAINING PROGRAM

## 2024-01-10 PROCEDURE — 1159F MED LIST DOCD IN RCRD: CPT | Mod: HCNC,CPTII,S$GLB, | Performed by: STUDENT IN AN ORGANIZED HEALTH CARE EDUCATION/TRAINING PROGRAM

## 2024-01-10 PROCEDURE — 3008F BODY MASS INDEX DOCD: CPT | Mod: HCNC,CPTII,S$GLB, | Performed by: STUDENT IN AN ORGANIZED HEALTH CARE EDUCATION/TRAINING PROGRAM

## 2024-01-10 PROCEDURE — 3075F SYST BP GE 130 - 139MM HG: CPT | Mod: HCNC,CPTII,S$GLB, | Performed by: STUDENT IN AN ORGANIZED HEALTH CARE EDUCATION/TRAINING PROGRAM

## 2024-01-10 PROCEDURE — 99214 OFFICE O/P EST MOD 30 MIN: CPT | Mod: HCNC,S$GLB,, | Performed by: STUDENT IN AN ORGANIZED HEALTH CARE EDUCATION/TRAINING PROGRAM

## 2024-01-10 RX ORDER — IBUPROFEN 200 MG
1 TABLET ORAL DAILY
Qty: 28 PATCH | Refills: 0 | Status: SHIPPED | OUTPATIENT
Start: 2024-01-10

## 2024-01-10 RX ORDER — NICOTINE 7MG/24HR
1 PATCH, TRANSDERMAL 24 HOURS TRANSDERMAL DAILY
Qty: 14 PATCH | Refills: 0 | Status: SHIPPED | OUTPATIENT
Start: 2024-01-10

## 2024-01-10 RX ORDER — IBUPROFEN 200 MG
1 TABLET ORAL DAILY
Qty: 14 PATCH | Refills: 0 | Status: SHIPPED | OUTPATIENT
Start: 2024-01-10

## 2024-01-10 RX ORDER — OLANZAPINE 10 MG/1
10 TABLET ORAL NIGHTLY
Qty: 30 TABLET | Refills: 11 | Status: SHIPPED | OUTPATIENT
Start: 2024-01-10 | End: 2025-01-09

## 2024-01-10 RX ORDER — DM/P-EPHED/ACETAMINOPH/DOXYLAM 30-7.5/3
2 LIQUID (ML) ORAL
Qty: 30 LOZENGE | Refills: 2 | Status: SHIPPED | OUTPATIENT
Start: 2024-01-10

## 2024-01-10 RX ORDER — MICONAZOLE NITRATE 2 %
2 CREAM (GRAM) TOPICAL
Qty: 30 EACH | Refills: 3 | Status: SHIPPED | OUTPATIENT
Start: 2024-01-10 | End: 2024-01-10

## 2024-01-10 NOTE — PROGRESS NOTES
Name: Lizbet Cooley  MRN: [id number]  : 1960  PCP: Tulio Quintero MD    Subjective   Patient presents for follow up.     Review of Systems   Respiratory:  Negative for shortness of breath.    Psychiatric/Behavioral:  The patient is nervous/anxious.        Patient Active Problem List   Diagnosis    Chronic obstructive pulmonary disease    Adjustment reaction with anxiety    Osteoporosis    Alcohol abuse with alcohol-induced anxiety disorder    Cigarette nicotine dependence without complication    Chronic pain syndrome    Chronic prescription opiate use    Lung nodule seen on imaging study    Change in voice    Postmenopausal    Anxiety    Tremor    Aortic atherosclerosis       Health Maintenance Due   Topic Date Due    COVID-19 Vaccine (1) Never done    Shingles Vaccine (1 of 2) Never done    LDCT Lung Screen  2022    Pneumococcal Vaccines (Age 0-64) (2 - PCV) 2022    Mammogram  2022    RSV Vaccine (Age 60+ and Pregnant patients) (1 - 1-dose 60+ series) Never done    Influenza Vaccine (1) Never done       Objective   Vitals:    01/10/24 1134   BP: 134/78   Pulse: 76       Physical Exam  Constitutional:       General: She is not in acute distress.     Appearance: Normal appearance. She is well-developed.   HENT:      Head: Normocephalic and atraumatic.      Right Ear: External ear normal.      Left Ear: External ear normal.   Eyes:      Conjunctiva/sclera: Conjunctivae normal.   Pulmonary:      Effort: Pulmonary effort is normal. No respiratory distress.   Abdominal:      General: Abdomen is flat. There is no distension.   Musculoskeletal:         General: No swelling or deformity.      Right lower leg: No edema.      Left lower leg: No edema.   Skin:     General: Skin is warm and dry.      Coloration: Skin is not jaundiced.   Neurological:      Mental Status: She is alert and oriented to person, place, and time. Mental status is at baseline.   Psychiatric:         Attention and  Perception: Attention and perception normal.         Mood and Affect: Mood normal.         Speech: Speech normal.         Behavior: Behavior normal. Behavior is cooperative.         Thought Content: Thought content normal.         Cognition and Memory: Cognition normal.         Judgment: Judgment normal.         Assessment & Plan   1. Tobacco use  Assessment & Plan:  Patient is requesting prescription for patches in the hopes that insurance will cover them. If they do not, I will send referral to smoking cessation.     Orders:  -     Discontinue: nicotine, polacrilex, (NICORETTE) 2 mg Gum; Take 1 each (2 mg total) by mouth as needed (nicotine craving).  Dispense: 30 each; Refill: 3    2. Bipolar 1 disorder  -     OLANZapine (ZYPREXA) 10 MG tablet; Take 1 tablet (10 mg total) by mouth every evening. Start with 1/2 tab nightly for the first week  Dispense: 30 tablet; Refill: 11    3. Breast cancer screening by mammogram  -     Mammo Digital Screening Bilat w/ [facility]; Future; Expected date: 01/10/2024    4. Anxiety  Assessment & Plan:  Poorly controlled. She had only been taking olanzapine as needed for sleep. I encouraged her to take it daily. If no improvement after 6 weeks, we will make adjustments.    Orders:  -     OLANZapine (ZYPREXA) 10 MG tablet; Take 1 tablet (10 mg total) by mouth every evening. Start with 1/2 tab nightly for the first week  Dispense: 30 tablet; Refill: 11    5. Chronic obstructive pulmonary disease, unspecified COPD type  Assessment & Plan:  Well controlled with her inhalers. Continue such.       6. Cigarette nicotine dependence without complication  Assessment & Plan:  Patient is requesting prescription for patches in the hopes that insurance will cover them. If they do not, I will send referral to smoking cessation.     Orders:  -     nicotine (NICODERM CQ) 21 mg/24 hr; Place 1 patch onto the skin once daily. Use these patches first  Dispense: 28 patch; Refill: 0  -     nicotine (NICODERM  CQ) 14 mg/24 hr; Place 1 patch onto the skin once daily. Use these patches after using all the 21mg patches  Dispense: 14 patch; Refill: 0  -     nicotine (NICODERM CQ) 7 mg/24 hr; Place 1 patch onto the skin once daily. Use these patches after completing the 21mg and 14mg patches  Dispense: 14 patch; Refill: 0  -     nicotine polacrilex 2 MG Lozg; Take 1 lozenge (2 mg total) by mouth as needed (nicotine craving).  Dispense: 30 lozenge; Refill: 2    7. Aortic atherosclerosis  Assessment & Plan:  Working on smoking cessation. Plan to draw blood work at next visit and discuss statin/aspirin use.          Follow up in 2 months.     Tulio Quintero MD  01/10/2024

## 2024-01-10 NOTE — ASSESSMENT & PLAN NOTE
Patient is requesting prescription for patches in the hopes that insurance will cover them. If they do not, I will send referral to smoking cessation.

## 2024-01-10 NOTE — ASSESSMENT & PLAN NOTE
Poorly controlled. She had only been taking olanzapine as needed for sleep. I encouraged her to take it daily. If no improvement after 6 weeks, we will make adjustments.

## 2024-01-10 NOTE — ASSESSMENT & PLAN NOTE
Working on smoking cessation. Plan to draw blood work at next visit and discuss statin/aspirin use.

## 2024-01-11 DIAGNOSIS — J44.9 CHRONIC OBSTRUCTIVE PULMONARY DISEASE, UNSPECIFIED COPD TYPE: ICD-10-CM

## 2024-01-11 NOTE — TELEPHONE ENCOUNTER
Care Due:                  Date            Visit Type   Department     Provider  --------------------------------------------------------------------------------                                EP -                              [facility] FAMILY  Last Visit: 01-      CARE ([facility])   MEDICINE       Tulio Quintero                               -                              [facility]  Next Visit: 02-      CARE ([facility])   MEDICINE       Tulio Quintero                                                            Last  Test          Frequency    Reason                     Performed    Due Date  --------------------------------------------------------------------------------    CMP.........  12 months..  alendronate..............  11-   10-    Mg Level....  12 months..  alendronate..............  Not Found    Overdue    Phosphate...  12 months..  alendronate..............  Not Found    Overdue    Vitamin D...  12 months..  alendronate..............  Not Found    Overdue    [employer] Embedded Care Due Messages. Reference number: [id number].   1/11/2024 5:08:06 PM CST

## 2024-01-12 RX ORDER — BUDESONIDE AND FORMOTEROL FUMARATE DIHYDRATE 80; 4.5 UG/1; UG/1
2 AEROSOL RESPIRATORY (INHALATION) 2 TIMES DAILY
Qty: 30.6 G | Refills: 3 | Status: SHIPPED | OUTPATIENT
Start: 2024-01-12

## 2024-01-12 RX ORDER — ALBUTEROL SULFATE 90 UG/1
2 AEROSOL, METERED RESPIRATORY (INHALATION) EVERY 6 HOURS PRN
Qty: 25.5 G | Refills: 3 | Status: SHIPPED | OUTPATIENT
Start: 2024-01-12

## 2024-01-12 NOTE — TELEPHONE ENCOUNTER
Refill Routing Note   Medication(s) are not appropriate for processing by [facility] for the following reason(s):        No active prescription written by provider  New or recently adjusted medication    [facility] action(s):  Defer     Requires labs : Yes             Appointments  past 12m or future 3m with PCP    Date Provider   Last Visit   1/10/2024 Tulio Quintero MD   Next Visit   2/28/2024 Tulio Quintero MD   ED visits in past 90 days: 0        Note composed:6:56 AM 01/12/2024

## 2024-01-17 DIAGNOSIS — M80.00XA OSTEOPOROSIS WITH CURRENT PATHOLOGICAL FRACTURE, UNSPECIFIED OSTEOPOROSIS TYPE, INITIAL ENCOUNTER: ICD-10-CM

## 2024-01-17 RX ORDER — ALENDRONATE SODIUM 70 MG/1
70 TABLET ORAL
Qty: 12 TABLET | Refills: 3 | Status: SHIPPED | OUTPATIENT
Start: 2024-01-17

## 2024-01-17 NOTE — TELEPHONE ENCOUNTER
No care due was identified.  [facility] Embedded Care Due Messages. Reference number: [id number].   1/17/2024 11:19:23 AM CST

## 2024-03-11 ENCOUNTER — PATIENT OUTREACH (OUTPATIENT)
Dept: ADMINISTRATIVE | Facility: HOSPITAL | Age: 64
End: 2024-03-11
Payer: MEDICARE

## 2024-03-11 NOTE — LETTER
March 11, 2024    Lizbet Cooley  [address]  [address] [place] [address]             [facility]  [address]  [place] [address]  Phone: [phone] Dear Lizbet,    Your [facility] primary care team is dedicated to assisting you achieve your health goals.  In order to do so, scheduling your routine screenings and tests are key to your good health.  Our records indicate you may be overdue for your mammogram screening.  Mammography screening can help find breast cancer at an early stage, when it is most likely to be successfully treated.    Tulio Quintero MD has entered your screening MAMMOGRAM order.      We encourage you to schedule your appointment at any of our [facility] imaging locations.  To do this online, please visit [email or web address] or contact our office at [phone] and we will be more than happy to assist you in scheduling your mammogram.     We are now offering early & late hours during the weekdays for Mammogram screenings here at [facility] and we have opened up Saturdays for Mammograms as well at [facility].    If you recently had your mammogram screening outside of [facility], please let us know so that we can update your health record.  Please send a message to your primary care physician via your [facility] account or contact his/her office.   Thank you for letting us care of your healthcare needs.

## 2024-03-11 NOTE — PROGRESS NOTES
Population Health Chart Review & Patient Outreach Details      Additional [facility] Health Notes:               Updates Requested / Reviewed:      Updated Care Coordination Note         Health Maintenance Topics Overdue:      VBHM Score: 2     Mammogram  LDCT Lung Screen    Influenza Vaccine  Pneumonia Vaccine  Shingles/Zoster Vaccine  RSV Vaccine                  Health Maintenance Topic(s) Outreach Outcomes & Actions Taken:    Breast Cancer Screening - Outreach Outcomes & Actions Taken  : Mammogram Order Placed and vm full letter mailed

## 2024-04-29 ENCOUNTER — PATIENT OUTREACH (OUTPATIENT)
Dept: ADMINISTRATIVE | Facility: HOSPITAL | Age: 64
End: 2024-04-29
Payer: MEDICARE

## 2024-04-29 NOTE — PROGRESS NOTES
Population Health Chart Review & Patient Outreach Details      Additional [facility] Health Notes:               Updates Requested / Reviewed:      External Sources: DIS         Health Maintenance Topics Overdue:      VBHM Score: 2     Mammogram  LDCT Lung Screen    Influenza Vaccine  Pneumonia Vaccine  Shingles/Zoster Vaccine  RSV Vaccine                  Health Maintenance Topic(s) Outreach Outcomes & Actions Taken:    Breast Cancer Screening - Outreach Outcomes & Actions Taken  : outreached to schedule mammogram

## 2024-04-29 NOTE — LETTER
April 29, 2024    Lizbet Cooley  [address]  [address] [place] [address]              Dear Lizbet,    Your [facility] primary care team is dedicated to assisting you achieve your health goals.  In order to do so, scheduling your routine screenings and tests are key to your good health.  Our records indicate you may be overdue for your mammogram screening.  Mammography screening can help find breast cancer at an early stage, when it is most likely to be successfully treated.    Tulio Quintero MD has entered your screening MAMMOGRAM order.      We encourage you to schedule your appointment at any of our [facility] imaging locations.  To do this online, please visit [email or web address] or contact our office at [phone] and we will be more than happy to assist you in scheduling your mammogram.     We are now offering early & late hours during the weekdays for Mammogram screenings here at [facility] and we have opened up Saturdays for Mammograms as well at [facility].    If you recently had your mammogram screening outside of [facility], please let us know so that we can update your health record.  Please send a message to your primary care physician via your [facility] account or contact his/her office.   Thank you for letting us care of your healthcare needs.

## 2024-07-15 ENCOUNTER — PATIENT OUTREACH (OUTPATIENT)
Dept: ADMINISTRATIVE | Facility: HOSPITAL | Age: 64
End: 2024-07-15
Payer: MEDICARE

## 2024-07-15 NOTE — LETTER
July 15, 2024    Lizbet Cooley  [address]  [address]  [place] [address]             [facility]  [address]  [place] [facility] [address]  Phone: [phone] Dear ,     Your [facility] primary care team is dedicated to assisting you achieve your health goals.  Scheduling routine screenings is important to maintaining good health.     Our records indicate you may be overdue for a Low Dose CT Lung Screen.  A low dose CT lung screen can help find lung cancer at an early stage, when it is most likely to be successfully treated.  This screen is recommended yearly for adults aged 50 to 80 years who have a 20 pack-year smoking history (If you smoked a pack a day for 20 years, or two packs a day for 10 years, you have 20 pack-years) and currently smoke or have quit within the past 15 years.     If you recently had a chest CT screening outside of [facility], please let your primary care team know so that they can update your health record.  If you have an upcoming scheduled CT appointment , please disregard this letter.      If you have questions or want to schedule your screening, please send a message to your primary care physician via [email or web address] or contact his/her office at [phone]        Thank you for letting us care for you,    Your [facility] Primary Care Team

## 2024-07-15 NOTE — PROGRESS NOTES
Population Health Chart Review & Patient Outreach Details      Additional [facility] Notes:               Updates Requested / Reviewed:      Updated Care Coordination Note         Health Maintenance Topics Overdue:      VBHM Score: 2     Mammogram  LDCT Lung Screen    Pneumonia Vaccine  Shingles/Zoster Vaccine  RSV Vaccine                  Health Maintenance Topic(s) Outreach Outcomes & Actions Taken:    Low Dose CT Screening - Outreach Outcomes & Actions Taken  : OUTREACHED

## 2024-08-01 ENCOUNTER — TELEPHONE (OUTPATIENT)
Dept: NEUROLOGY | Facility: CLINIC | Age: 64
End: 2024-08-01
Payer: MEDICARE

## 2024-08-01 NOTE — TELEPHONE ENCOUNTER
----- Message from Lissa Osvaldo sent at 8/1/2024 10:52 AM CDT -----  Regarding: new rx  [facility]  Type:  RX Refill Request    Who Called:  [facility]   Refill or New Rx:  new   RX Name and Strength:  propranoloL (INDERAL) 20 MG tablet  How is the patient currently taking it? (ex. 1XDay):  as directed  Is this a 30 day or 90 day RX:  90  Preferred Pharmacy with phone number:      [facility] [place] - [address] [address]  [address] [address] [address]  Phone: [phone] Fax: [phone]      Local or Mail Order:  local  Ordering Provider:  Brie Adame  Best Call Back Number:  [phone]  Additional Information:  Please call [place] to advise.  Thanks!

## 2024-08-01 NOTE — TELEPHONE ENCOUNTER
Spoke to the pt, she does not have a portal.  She will speak to her sister to see if she can activate the portal to do a f/u appt.  She stated, she will call back tomorrow.

## 2024-08-16 ENCOUNTER — TELEPHONE (OUTPATIENT)
Dept: FAMILY MEDICINE | Facility: CLINIC | Age: 64
End: 2024-08-16
Payer: MEDICARE

## 2024-08-16 ENCOUNTER — TELEPHONE (OUTPATIENT)
Dept: NEUROLOGY | Facility: CLINIC | Age: 64
End: 2024-08-16
Payer: MEDICARE

## 2024-08-16 DIAGNOSIS — R91.1 LUNG NODULE SEEN ON IMAGING STUDY: ICD-10-CM

## 2024-08-16 DIAGNOSIS — F17.210 CIGARETTE NICOTINE DEPENDENCE WITHOUT COMPLICATION: Primary | ICD-10-CM

## 2024-08-16 NOTE — TELEPHONE ENCOUNTER
----- Message from Faith Smith Patient Care Assistant sent at 8/16/2024 11:54 AM CDT -----  Contact: Pt  Type:  Sooner Appointment Request  Caller is requesting a sooner appointment.  Caller declined first available appointment listed below.  Caller will not accept being placed on the waitlist and is requesting a message be sent to doctor.  Name of Caller:  Pt  When is the first available appointment?  No aail appts  Symptoms:  Tremors, Trouble Speaking  Best Call Back Number:  [phone] (home)   Additional Information:  Please Advise

## 2024-08-16 NOTE — TELEPHONE ENCOUNTER
Spoke with patient. Per her request, appt scheduled 8/21/24 at 1:30pm with Brie Adame. Directions for location given. She verbalized understanding.

## 2024-08-16 NOTE — TELEPHONE ENCOUNTER
----- Message from Neeru Sánchez sent at 8/16/2024 11:46 AM CDT -----  Type: Needs Medical Advice  Who Called:  fariha     Best Call Back Number: [phone]    Additional Information: pt has received a letter stating she is overdue for a ct of the lungs and needs order please advise

## 2024-08-21 ENCOUNTER — OFFICE VISIT (OUTPATIENT)
Dept: NEUROLOGY | Facility: CLINIC | Age: 64
End: 2024-08-21
Payer: MEDICARE

## 2024-08-21 VITALS
HEART RATE: 79 BPM | SYSTOLIC BLOOD PRESSURE: 158 MMHG | HEIGHT: 66 IN | RESPIRATION RATE: 17 BRPM | WEIGHT: 112.88 LBS | DIASTOLIC BLOOD PRESSURE: 83 MMHG | BODY MASS INDEX: 18.14 KG/M2

## 2024-08-21 DIAGNOSIS — F17.210 CIGARETTE NICOTINE DEPENDENCE WITHOUT COMPLICATION: ICD-10-CM

## 2024-08-21 DIAGNOSIS — R25.1 TREMOR: Primary | ICD-10-CM

## 2024-08-21 PROCEDURE — 3079F DIAST BP 80-89 MM HG: CPT | Mod: HCNC,CPTII,S$GLB, | Performed by: NURSE PRACTITIONER

## 2024-08-21 PROCEDURE — 99999 PR PBB SHADOW E&M-EST. PATIENT-LVL IV: CPT | Mod: PBBFAC,HCNC,, | Performed by: NURSE PRACTITIONER

## 2024-08-21 PROCEDURE — 3077F SYST BP >= 140 MM HG: CPT | Mod: HCNC,CPTII,S$GLB, | Performed by: NURSE PRACTITIONER

## 2024-08-21 PROCEDURE — 1160F RVW MEDS BY RX/DR IN RCRD: CPT | Mod: HCNC,CPTII,S$GLB, | Performed by: NURSE PRACTITIONER

## 2024-08-21 PROCEDURE — 99214 OFFICE O/P EST MOD 30 MIN: CPT | Mod: HCNC,S$GLB,, | Performed by: NURSE PRACTITIONER

## 2024-08-21 PROCEDURE — 1159F MED LIST DOCD IN RCRD: CPT | Mod: HCNC,CPTII,S$GLB, | Performed by: NURSE PRACTITIONER

## 2024-08-21 PROCEDURE — 3008F BODY MASS INDEX DOCD: CPT | Mod: HCNC,CPTII,S$GLB, | Performed by: NURSE PRACTITIONER

## 2024-08-21 RX ORDER — PROPRANOLOL HYDROCHLORIDE 40 MG/1
40 TABLET ORAL 2 TIMES DAILY
Qty: 180 TABLET | Refills: 3 | Status: SHIPPED | OUTPATIENT
Start: 2024-08-21 | End: 2025-08-21

## 2024-08-21 RX ORDER — LIDOCAINE AND PRILOCAINE 25; 25 MG/G; MG/G
CREAM TOPICAL
COMMUNITY
Start: 2024-07-01

## 2024-08-21 NOTE — ASSESSMENT & PLAN NOTE
Patient is 62 y/o female that presents for tremor f/u.  She continues to report a bilateral hand tremor that occurs with action. It is intermittent.   She also has a noticeable vocal tremor that has been ongoing for the 4 years.    - there is associated hoarseness but she denies pain or difficulty swallowing. She does have to strain to speak.   She was previously evaluated buy ENT in 2019 and recommended that she stop smoking as her w/u was essentially unrevealing.  Neuro exam with mild, low amplitude bilateral hand tremor. Vocal tremor noted   - likely that her reported hand tremors are mood related but also could be an effect from taking Olanzapine  Suspect ET as her sister and brother also have a tremor  Discussed role of gold standard medication with the patient at length.   - She was started on propranolol 20 mg BID in 2022 but self discontinued it stating it didn't offer aid. She also did not f/u with me.    - re-start propranolol at 40 mg BID  Encouraged pt to keep future appts with me   Consider referral to ENT for Botox consideration    - pt opted to hold for now d/t transportation issues

## 2024-08-21 NOTE — PROGRESS NOTES
"  NEUROLOGY  Outpatient Follow Up    [facility]  [address] [facility] [address], [place] [address]  [phone] (office) / [phone] (fax)    Patient Name:  Lizbet Cooley  :  1960  MR #:  [id number]  Acct #:  [id number]    Date of Neurology Visit: 2024  Name of Provider: DEBBY Wagner    Other Physicians:  Tulio Quintero MD (Primary Care Physician); No ref. provider found (Referring)      Chief Complaint: Tremors      History of Present Illness (HPI):  Lizbet Cooley is a right handed 61 y.o. female with a PMHX of anxiety, bipolar disorder, COPD, osteoporosis  Patient is here today for tremor. It is a vocal tremor and has been going on for the last year and a half. She denies throat pain or swallowing difficulty. She did see ENT back in 2021 for hoarseness. A scope was done and reported to be unrevealing. She was told to quit smoking but has yet to do this. She smokes about 1 pack per day. She wants to quit.   She also endorses a hand tremor that started "years ago". She reports it to be to both hands and occurs at rest and when grabbing for items. It is not constant but occurs daily. The vocal tremor is constant.     Patient does suffer severe anxiety and was previously referred to psychiatry but has never seen them. She reports thinking "horrible thoughts" about bad things happening to her family. She denies suicidal ideation. She was previously prescribed Zyprexa years ago for sleep but reports only taking it about 1 week.     She reports that her sister and brother both have a hand tremor. She drinks about 3-4 beers (12 oz) at least 3-4 nights/week. She does this to help her calm down and states it does help with her tremors. She drinks 1 cup of coffee (8oz) every morning and water throughout the day. She reports that her writing is shaky.           Interval Hx 2024:   Patient presents today for follow up on vocal tremor and hand tremors. She is unsure if her " vocal tremor has worsened but states she has to strain to speak. This is bothersome to her.    Her hand tremors remain intermittent and reported to be worse in the morning. They are noticed when handling items. It does not impede on ADLs.   She was previously started on Propranolol in 2022 but states she stopped it after several weeks stating it didn't help. She also didn't follow back up with me.   She also takes Zyprexa PRN for sleep. She reports a great deal of family stressors and needs this medication for sleep aid.   She continues to smoke 1 ppd.                 Past Medical, Surgical, Family & Social History:   Reviewed and updated.    Home Medications:     Current Outpatient Medications:     albuterol (PROVENTIL/VENTOLIN HFA) 90 mcg/actuation inhaler, Inhale 2 puffs into the lungs every 6 (six) hours as needed for Wheezing. Rescue, Disp: 25.5 g, Rfl: 3    alendronate (FOSAMAX) 70 MG tablet, Take 1 tablet (70 mg total) by mouth every 7 days., Disp: 12 tablet, Rfl: 3    calcium-vitamin D3 (OS-DARIUSZ 500 + D3) 500 mg-5 mcg (200 unit) per tablet, Take 1 tablet by mouth once daily., Disp: , Rfl:     LIDOcaine-prilocaine (EMLA) cream, Apply 1 gram 3 times a day by topical route as needed for 30 days., Disp: , Rfl:     loratadine (CLARITIN) 10 mg tablet, Take 10 mg by mouth once daily., Disp: , Rfl:     meloxicam (MOBIC) 15 MG tablet, Take 15 mg by mouth daily as needed., Disp: , Rfl:     mv,Ca,min-folic acid-vit K1 400-20 mcg Tab, Take 1 tablet by mouth once daily., Disp: , Rfl:     NARCAN 4 mg/actuation Spry, PLEASE SEE ATTACHED FOR DETAILED DIRECTIONS, Disp: , Rfl:     OLANZapine (ZYPREXA) 10 MG tablet, Take 1 tablet (10 mg total) by mouth every evening. Start with 1/2 tab nightly for the first week, Disp: 30 tablet, Rfl: 11    oxyCODONE-acetaminophen (PERCOCET)  mg per tablet, TAKE 1 TABLET BY ORAL ROUTE 1   2 TIMES EVERY DAY AS NEEDED., Disp: , Rfl:     SYMBICORT 80-4.5 mcg/actuation HFAA, Inhale 2 puffs  "into the lungs 2 (two) times a day. Controller, Disp: 30.6 g, Rfl: 3    nicotine (NICODERM CQ) 14 mg/24 hr, Place 1 patch onto the skin once daily. Use these patches after using all the 21mg patches (Patient not taking: Reported on 8/21/2024), Disp: 14 patch, Rfl: 0    nicotine (NICODERM CQ) 21 mg/24 hr, Place 1 patch onto the skin once daily. Use these patches first (Patient not taking: Reported on 8/21/2024), Disp: 28 patch, Rfl: 0    nicotine (NICODERM CQ) 7 mg/24 hr, Place 1 patch onto the skin once daily. Use these patches after completing the 21mg and 14mg patches (Patient not taking: Reported on 8/21/2024), Disp: 14 patch, Rfl: 0    nicotine polacrilex 2 MG Lozg, Take 1 lozenge (2 mg total) by mouth as needed (nicotine craving). (Patient not taking: Reported on 8/21/2024), Disp: 30 lozenge, Rfl: 2    propranoloL (INDERAL) 20 MG tablet, TAKE 1 TABLET BY MOUTH TWICE A DAY (Patient not taking: Reported on 8/21/2024), Disp: 180 tablet, Rfl: 1    Physical Examination:  BP (!) 158/83 (BP Location: Right arm, Patient Position: Sitting, BP Method: Medium (Automatic))   Pulse 79   Resp 17   Ht 5' 6" (1.676 m)   Wt 51.2 kg (112 lb 14 oz)   BMI 18.22 kg/m²     GENERAL:  General appearance: Well, non-toxic appearing.  No apparent distress.  Neck: supple.  .    MENTAL STATUS:  Alertness, attention span & concentration: normal.  Language: normal.  Orientation to self, place & time:  normal.  Memory, recent & remote: normal.  Fund of knowledge: normal.      SPEECH:  Vocal tremor noted  Follows complex commands.      CRANIAL NERVES:  Cranial Nerves II-XII were examined.  II - Visual fields: normal.  III, IV, VI: PERRL, EOMI, No ptosis, No nystagmus.  V - Facial sensation: normal.  VII - Face symmetry & mobility: normal.  VIII - Hearing: normal  IX, X - Palate: mobile & midline.  XI - Shoulder shrug: normal.  XII - Tongue protrusion: normal.        GROSS MOTOR:  Gait & station: able to rise from chair without issue; " antalgic appearing due to leg and back pain  Tone: normal.  Arms to core: bilateral hand tremor; low amplitude   Arms extended:  bilateral hand tremor; low amplitude   Abnormal movements: none.  Finger-nose: normal.  Rapid alternating movements: normal.  Pronator drift: normal  Mild head tremor    MUSCLE STRENGTH:   Hand grasp:   - right:5/5   - left:5/5    RIGHT    LEFT   5 Deltoids 5   5 Sh.Ext.Rot. 5   5 Sh.Int.Rot. 5   5 Biceps 5   5 Triceps 5   5 Forearm.Pr. 5        5 Iliopsoas flex    5   5 Hip Abduct 5   5 Hip Adduct 5   5 Quads 5   5 Hams 5   5 Dorsiflex 5   5 Plantar Flex 5     REFLEXES:    RIGHT Reflex   LEFT   2 Biceps 2   2 Brachiorad. 2        2 Patellar 2     SENSORY:  Light touch: Normal throughout.             Diagnostic Data Reviewed:   I have personally reviewed provider notes, labs and imaging made available to me today.     Imaging:  CT Head Without Contrast 1/2022    Narrative  EXAMINATION:  CT HEAD WITHOUT CONTRAST    CLINICAL HISTORY:  Facial trauma, blunt;Head trauma, moderate-severe;    TECHNIQUE:  Low dose axial images were obtained through the head.  Coronal and sagittal reformations were also performed. Contrast was not administered.    COMPARISON:  None.    FINDINGS:  The bone window portion of the examination indicates no obvious evidence of an acute fracture injury of the skull.  The skull base appears normal.  No indication of paranasal sinus congestion.  There is deviation of the nasal septum towards the right.  The mastoid air cells are unremarkable.  Incidental note is made of a thickening and serpiginous appearance of the external auditory canal on the right.    Imaging of the brain parenchyma reveals no evidence of a mass edema midline shift or extra-axial fluid collection.  The gray-white interface is intact.    The lateral ventricles 3rd ventricle and 4th ventricle are also unremarkable.  No obvious gross abnormality involving the cistern or the cerebellum.    In the sagittal  "plane the corpus callosum, mid brain, brainstem and proximal spinal cord all appear normal.  No indication of a suprasellar mass.  The pituitary and optic chiasm are unremarkable.    The contents of the orbits show no obvious gross abnormality.    Note is made of what appears to be a soft tissue defect just above the left eye most likely representing a laceration or abrasion.    Impression  No apparent evidence of an acute injury involving the skull base skull or facial bones.    Apparent soft tissue defect seen involving the superior aspect of the left orbit roughly at the level of the eye brow.    Irregular appearance of the external auditory canal on the left suggestive of otitis externa.    This report was flagged in Epic as abnormal.      Cardiac:    Labs:  Lab Results   Component Value Date    WBC 8.15 11/04/2022    HGB 15.0 11/04/2022    HCT 45.2 11/04/2022     11/04/2022    MCV 96 11/04/2022    RDW 13.5 11/04/2022     Lab Results   Component Value Date     11/04/2022    K 5.2 (H) 11/04/2022     11/04/2022    CO2 22 (L) 11/04/2022    BUN 9 11/04/2022    CREATININE 0.7 11/04/2022    GLU 94 11/04/2022    CALCIUM 10.5 11/04/2022     Lab Results   Component Value Date    PROT 7.4 11/04/2022    ALBUMIN 4.3 11/04/2022    BILITOT 0.9 11/04/2022    AST 23 11/04/2022    ALKPHOS 129 11/04/2022    ALT 19 11/04/2022     No results found for: "INR", "PROTIME", "PTT"  Lab Results   Component Value Date    CHOL 274 (H) 10/11/2019    HDL 80 (H) 10/11/2019    LDLCALC 149.0 10/11/2019    TRIG 225 (H) 10/11/2019    CHOLHDL 29.2 10/11/2019     No results found for: "LABA1C", "HGBA1C"   Lab Results   Component Value Date    LTTTBUGR05 400 11/04/2022     Lab Results   Component Value Date    FOLATE 35.3 (H) 04/20/2022     Lab Results   Component Value Date    TSH 0.218 (L) 11/04/2022     No results found for: "RPR"  No results found for: "VITAMINB1"  No components found for: "HIV 1/2 " "AG/AB"                    Assessment and Plan:      Problem List Items Addressed This Visit          Neuro    Tremor - Primary    Current Assessment & Plan     Patient is 64 y/o female that presents for tremor f/u.  She continues to report a bilateral hand tremor that occurs with action. It is intermittent.   She also has a noticeable vocal tremor that has been ongoing for the 4 years.    - there is associated hoarseness but she denies pain or difficulty swallowing. She does have to strain to speak.   She was previously evaluated buy ENT in 2019 and recommended that she stop smoking as her w/u was essentially unrevealing.  Neuro exam with mild, low amplitude bilateral hand tremor. Vocal tremor noted   - likely that her reported hand tremors are mood related but also could be an effect from taking Olanzapine  Suspect ET as her sister and brother also have a tremor  Discussed role of gold standard medication with the patient at length.   - She was started on propranolol 20 mg BID in 2022 but self discontinued it stating it didn't offer aid. She also did not f/u with me.    - re-start propranolol at 40 mg BID  Encouraged pt to keep future appts with me   Consider referral to ENT for Botox consideration    - pt opted to hold for now d/t transportation issues             Other    Cigarette nicotine dependence without complication    Current Assessment & Plan     Dangers of cigarette smoking were reviewed with patient in detail. Patient was Counseled for 3-10 minutes. Nicotine replacement options were discussed. Nicotine replacement was discussed- not prescribed per patient's request              Important to note, also  has a past medical history of Bipolar disorder and Trimalleolar fracture of ankle, closed, left, initial encounter (11/14/2019).          The patient will return to clinic in 1 months.    All questions were answered and patient is comfortable with the plan.         Thank you very much for the opportunity " to assist in this patient's care.    If you have any questions or concerns, please do not hesitate to contact me at any time.      Sincerely,     DEBBY Wagner  [facility] [place]

## 2024-09-23 ENCOUNTER — OFFICE VISIT (OUTPATIENT)
Dept: NEUROLOGY | Facility: CLINIC | Age: 64
End: 2024-09-23
Payer: MEDICARE

## 2024-09-23 VITALS
RESPIRATION RATE: 16 BRPM | HEART RATE: 69 BPM | HEIGHT: 66 IN | BODY MASS INDEX: 18.42 KG/M2 | DIASTOLIC BLOOD PRESSURE: 80 MMHG | WEIGHT: 114.63 LBS | SYSTOLIC BLOOD PRESSURE: 124 MMHG

## 2024-09-23 DIAGNOSIS — R25.1 TREMOR: Primary | ICD-10-CM

## 2024-09-23 PROCEDURE — 3008F BODY MASS INDEX DOCD: CPT | Mod: HCNC,CPTII,S$GLB, | Performed by: NURSE PRACTITIONER

## 2024-09-23 PROCEDURE — 99214 OFFICE O/P EST MOD 30 MIN: CPT | Mod: HCNC,S$GLB,, | Performed by: NURSE PRACTITIONER

## 2024-09-23 PROCEDURE — 3079F DIAST BP 80-89 MM HG: CPT | Mod: HCNC,CPTII,S$GLB, | Performed by: NURSE PRACTITIONER

## 2024-09-23 PROCEDURE — 1160F RVW MEDS BY RX/DR IN RCRD: CPT | Mod: HCNC,CPTII,S$GLB, | Performed by: NURSE PRACTITIONER

## 2024-09-23 PROCEDURE — 99999 PR PBB SHADOW E&M-EST. PATIENT-LVL IV: CPT | Mod: PBBFAC,HCNC,, | Performed by: NURSE PRACTITIONER

## 2024-09-23 PROCEDURE — 3074F SYST BP LT 130 MM HG: CPT | Mod: HCNC,CPTII,S$GLB, | Performed by: NURSE PRACTITIONER

## 2024-09-23 PROCEDURE — 1159F MED LIST DOCD IN RCRD: CPT | Mod: HCNC,CPTII,S$GLB, | Performed by: NURSE PRACTITIONER

## 2024-09-23 RX ORDER — BUPRENORPHINE 10 UG/H
1 PATCH TRANSDERMAL
COMMUNITY
Start: 2024-09-06

## 2024-09-23 RX ORDER — PROPRANOLOL HYDROCHLORIDE 80 MG/1
80 TABLET ORAL 2 TIMES DAILY
Qty: 60 TABLET | Refills: 11 | Status: SHIPPED | OUTPATIENT
Start: 2024-09-23 | End: 2025-09-23

## 2024-09-23 RX ORDER — METHOCARBAMOL 500 MG/1
500 TABLET, FILM COATED ORAL 2 TIMES DAILY PRN
COMMUNITY
Start: 2024-08-28

## 2024-09-23 RX ORDER — DICLOFENAC SODIUM 50 MG/1
50 TABLET, DELAYED RELEASE ORAL 3 TIMES DAILY
COMMUNITY

## 2024-09-23 NOTE — ASSESSMENT & PLAN NOTE
Patient is 64 y/o female that presents for tremor f/u.    Intermittent, bilateral hand tremor that occurs with action.  vocal tremor that has been ongoing for the 4 years.    - there is associated hoarseness but she denies pain or difficulty swallowing. She does have to strain to speak.     Neuro exam noted with some improvement.  Vocal tremor noted   - likely that her reported hand tremors are mood related but also could be an effect from taking Olanzapine  Suspect ET as her sister and brother also have a tremor  Discussed role of gold standard medication with the patient at length.   - She was started on propranolol 20 mg BID in 2022 but self discontinued it stating it didn't offer aid. She also did not f/u with me.    - re-start propranolol at 40 mg BID and tramaine well   - increase to 80 mg BID  Encouraged pt to keep future appts with me   Consider referral to ENT for Botox consideration    - pt opted to hold for now d/t transportation issues

## 2024-09-23 NOTE — PROGRESS NOTES
"  NEUROLOGY  Outpatient Follow Up    [facility]  [address] [facility], [place] [address]  [phone] (office) / [phone] (fax)    Patient Name:  Lizbet Cooley  :  1960  MR #:  [id number]  Acct #:  [id number]    Date of Neurology Visit: 2024  Name of Provider: DEBBY Wagner    Other Physicians:  Tulio Quintero MD (Primary Care Physician); No ref. provider found (Referring)      Chief Complaint: Tremors and Follow-up      History of Present Illness (HPI):  Lizbet Cooley is a right handed 61 y.o. female with a PMHX of anxiety, bipolar disorder, COPD, osteoporosis  Patient is here today for tremor. It is a vocal tremor and has been going on for the last year and a half. She denies throat pain or swallowing difficulty. She did see ENT back in 2021 for hoarseness. A scope was done and reported to be unrevealing. She was told to quit smoking but has yet to do this. She smokes about 1 pack per day. She wants to quit.   She also endorses a hand tremor that started "years ago". She reports it to be to both hands and occurs at rest and when grabbing for items. It is not constant but occurs daily. The vocal tremor is constant.     Patient does suffer severe anxiety and was previously referred to psychiatry but has never seen them. She reports thinking "horrible thoughts" about bad things happening to her family. She denies suicidal ideation. She was previously prescribed Zyprexa years ago for sleep but reports only taking it about 1 week.     She reports that her sister and brother both have a hand tremor. She drinks about 3-4 beers (12 oz) at least 3-4 nights/week. She does this to help her calm down and states it does help with her tremors. She drinks 1 cup of coffee (8oz) every morning and water throughout the day. She reports that her writing is shaky.           Interval Hx 2024:   Patient presents today for follow up on vocal tremor and hand tremors. She is " unsure if her vocal tremor has worsened but states she has to strain to speak. This is bothersome to her.    Her hand tremors remain intermittent and reported to be worse in the morning. They are noticed when handling items. It does not impede on ADLs.   She was previously started on Propranolol in 2022 but states she stopped it after several weeks stating it didn't help. She also didn't follow back up with me.   She also takes Zyprexa PRN for sleep. She reports a great deal of family stressors and needs this medication for sleep aid.   She continues to smoke 1 ppd.       Interval Hx 9/23/2024:  Patient presents today for follow up on vocal tremor and hand tremors. She believes her hand tremor is a little better. She still has to strain to talk. Tremor does not impede on ADLs.  She is tolerating Propranolol well.    She continues to smoke 1 ppd.                 Past Medical, Surgical, Family & Social History:   Reviewed and updated.    Home Medications:     Current Outpatient Medications:     albuterol (PROVENTIL/VENTOLIN HFA) 90 mcg/actuation inhaler, Inhale 2 puffs into the lungs every 6 (six) hours as needed for Wheezing. Rescue, Disp: 25.5 g, Rfl: 3    alendronate (FOSAMAX) 70 MG tablet, Take 1 tablet (70 mg total) by mouth every 7 days., Disp: 12 tablet, Rfl: 3    buprenorphine 10 mcg/hr (BUTRANS) weekly patch, 1 patch every 7 days., Disp: , Rfl:     calcium-vitamin D3 (OS-DARIUSZ 500 + D3) 500 mg-5 mcg (200 unit) per tablet, Take 1 tablet by mouth once daily., Disp: , Rfl:     diclofenac (VOLTAREN) 50 MG EC tablet, Take 50 mg by mouth 3 (three) times daily., Disp: , Rfl:     LIDOcaine-prilocaine (EMLA) cream, Apply 1 gram 3 times a day by topical route as needed for 30 days., Disp: , Rfl:     meloxicam (MOBIC) 15 MG tablet, Take 15 mg by mouth daily as needed., Disp: , Rfl:     methocarbamoL (ROBAXIN) 500 MG Tab, Take 500 mg by mouth 2 (two) times daily as needed., Disp: , Rfl:     mv,Ca,min-folic acid-vit K1  "400-20 mcg Tab, Take 1 tablet by mouth once daily., Disp: , Rfl:     OLANZapine (ZYPREXA) 10 MG tablet, Take 1 tablet (10 mg total) by mouth every evening. Start with 1/2 tab nightly for the first week, Disp: 30 tablet, Rfl: 11    oxyCODONE-acetaminophen (PERCOCET)  mg per tablet, TAKE 1 TABLET BY ORAL ROUTE 1   2 TIMES EVERY DAY AS NEEDED., Disp: , Rfl:     SYMBICORT 80-4.5 mcg/actuation HFAA, Inhale 2 puffs into the lungs 2 (two) times a day. Controller, Disp: 30.6 g, Rfl: 3    loratadine (CLARITIN) 10 mg tablet, Take 10 mg by mouth once daily. (Patient not taking: Reported on 9/23/2024), Disp: , Rfl:     NARCAN 4 mg/actuation Spry, PLEASE SEE ATTACHED FOR DETAILED DIRECTIONS (Patient not taking: Reported on 9/23/2024), Disp: , Rfl:     nicotine (NICODERM CQ) 14 mg/24 hr, Place 1 patch onto the skin once daily. Use these patches after using all the 21mg patches (Patient not taking: Reported on 8/21/2024), Disp: 14 patch, Rfl: 0    nicotine (NICODERM CQ) 21 mg/24 hr, Place 1 patch onto the skin once daily. Use these patches first (Patient not taking: Reported on 8/21/2024), Disp: 28 patch, Rfl: 0    nicotine (NICODERM CQ) 7 mg/24 hr, Place 1 patch onto the skin once daily. Use these patches after completing the 21mg and 14mg patches (Patient not taking: Reported on 8/21/2024), Disp: 14 patch, Rfl: 0    nicotine polacrilex 2 MG Lozg, Take 1 lozenge (2 mg total) by mouth as needed (nicotine craving). (Patient not taking: Reported on 8/21/2024), Disp: 30 lozenge, Rfl: 2    propranoloL (INDERAL) 80 MG tablet, Take 1 tablet (80 mg total) by mouth 2 (two) times daily., Disp: 60 tablet, Rfl: 11    Physical Examination:  /80 (BP Location: Left arm, Patient Position: Sitting, BP Method: Medium (Automatic))   Pulse 69   Resp 16   Ht 5' 6" (1.676 m)   Wt 52 kg (114 lb 10.2 oz)   BMI 18.50 kg/m²     GENERAL:  General appearance: Well, non-toxic appearing.  No apparent distress.  Neck: supple.  .    MENTAL " STATUS:  Alertness, attention span & concentration: normal.  Language: normal.  Orientation to self, place & time:  normal.  Memory, recent & remote: normal.  Fund of knowledge: normal.      SPEECH:  Vocal tremor noted  Follows complex commands.      CRANIAL NERVES:  Cranial Nerves II-XII were examined.  II - Visual fields: normal.  III, IV, VI: PERRL, EOMI, No ptosis, No nystagmus.  V - Facial sensation: normal.  VII - Face symmetry & mobility: normal.  VIII - Hearing: normal  IX, X - Palate: mobile & midline.  XI - Shoulder shrug: normal.  XII - Tongue protrusion: normal.        GROSS MOTOR:  Gait & station: able to rise from chair without issue; antalgic appearing due to leg and back pain  Tone: normal.  Arms to core: no tremor  Arms extended:  no tremor  Abnormal movements: none.  Finger-nose: normal.  Rapid alternating movements: normal.  Pronator drift: normal  Mild head tremor (improved)    MUSCLE STRENGTH:   Hand grasp:   - right:5/5   - left:5/5    RIGHT    LEFT   5 Deltoids 5   5 Sh.Ext.Rot. 5   5 Sh.Int.Rot. 5   5 Biceps 5   5 Triceps 5   5 Forearm.Pr. 5        5 Iliopsoas flex    5   5 Hip Abduct 5   5 Hip Adduct 5   5 Quads 5   5 Hams 5   5 Dorsiflex 5   5 Plantar Flex 5     REFLEXES:    RIGHT Reflex   LEFT   2 Biceps 2   2 Brachiorad. 2        2 Patellar 2     SENSORY:  Light touch: Normal throughout.             Diagnostic Data Reviewed:   I have personally reviewed provider notes, labs and imaging made available to me today.     Imaging:  CT Head Without Contrast 1/2022    Narrative  EXAMINATION:  CT HEAD WITHOUT CONTRAST    CLINICAL HISTORY:  Facial trauma, blunt;Head trauma, moderate-severe;    TECHNIQUE:  Low dose axial images were obtained through the head.  Coronal and sagittal reformations were also performed. Contrast was not administered.    COMPARISON:  None.    FINDINGS:  The bone window portion of the examination indicates no obvious evidence of an acute fracture injury of the skull.  The  skull base appears normal.  No indication of paranasal sinus congestion.  There is deviation of the nasal septum towards the right.  The mastoid air cells are unremarkable.  Incidental note is made of a thickening and serpiginous appearance of the external auditory canal on the right.    Imaging of the brain parenchyma reveals no evidence of a mass edema midline shift or extra-axial fluid collection.  The gray-white interface is intact.    The lateral ventricles 3rd ventricle and 4th ventricle are also unremarkable.  No obvious gross abnormality involving the cistern or the cerebellum.    In the sagittal plane the corpus callosum, mid brain, brainstem and proximal spinal cord all appear normal.  No indication of a suprasellar mass.  The pituitary and optic chiasm are unremarkable.    The contents of the orbits show no obvious gross abnormality.    Note is made of what appears to be a soft tissue defect just above the left eye most likely representing a laceration or abrasion.    Impression  No apparent evidence of an acute injury involving the skull base skull or facial bones.    Apparent soft tissue defect seen involving the superior aspect of the left orbit roughly at the level of the eye brow.    Irregular appearance of the external auditory canal on the left suggestive of otitis externa.    This report was flagged in Epic as abnormal.      Cardiac:    Labs:  Lab Results   Component Value Date    WBC 8.15 11/04/2022    HGB 15.0 11/04/2022    HCT 45.2 11/04/2022     11/04/2022    MCV 96 11/04/2022    RDW 13.5 11/04/2022     Lab Results   Component Value Date     11/04/2022    K 5.2 (H) 11/04/2022     11/04/2022    CO2 22 (L) 11/04/2022    BUN 9 11/04/2022    CREATININE 0.7 11/04/2022    GLU 94 11/04/2022    CALCIUM 10.5 11/04/2022     Lab Results   Component Value Date    PROT 7.4 11/04/2022    ALBUMIN 4.3 11/04/2022    BILITOT 0.9 11/04/2022    AST 23 11/04/2022    ALKPHOS 129 11/04/2022    ALT  "19 11/04/2022     No results found for: "INR", "PROTIME", "PTT"  Lab Results   Component Value Date    CHOL 274 (H) 10/11/2019    HDL 80 (H) 10/11/2019    LDLCALC 149.0 10/11/2019    TRIG 225 (H) 10/11/2019    CHOLHDL 29.2 10/11/2019     No results found for: "LABA1C", "HGBA1C"   Lab Results   Component Value Date    OEWBPEGP08 400 11/04/2022     Lab Results   Component Value Date    FOLATE 35.3 (H) 04/20/2022     Lab Results   Component Value Date    TSH 0.218 (L) 11/04/2022     No results found for: "RPR"  No results found for: "VITAMINB1"  No components found for: "HIV 1/2 AG/AB"                    Assessment and Plan:  Problem List Items Addressed This Visit          Neuro    Tremor - Primary    Current Assessment & Plan     Patient is 62 y/o female that presents for tremor f/u.    Intermittent, bilateral hand tremor that occurs with action.  vocal tremor that has been ongoing for the 4 years.    - there is associated hoarseness but she denies pain or difficulty swallowing. She does have to strain to speak.     Neuro exam noted with some improvement.  Vocal tremor noted   - likely that her reported hand tremors are mood related but also could be an effect from taking Olanzapine  Suspect ET as her sister and brother also have a tremor  Discussed role of gold standard medication with the patient at length.   - She was started on propranolol 20 mg BID in 2022 but self discontinued it stating it didn't offer aid. She also did not f/u with me.    - re-start propranolol at 40 mg BID and tramaine well   - increase to 80 mg BID  Encouraged pt to keep future appts with me   Consider referral to ENT for Botox consideration    - pt opted to hold for now d/t transportation issues                   Important to note, also  has a past medical history of Bipolar disorder and Trimalleolar fracture of ankle, closed, left, initial encounter (11/14/2019).          The patient will return to clinic in 2 months.    All questions were " answered and patient is comfortable with the plan.         Thank you very much for the opportunity to assist in this patient's care.    If you have any questions or concerns, please do not hesitate to contact me at any time.      Sincerely,     DEBBY Wagner  [facility] [place]

## 2024-11-20 ENCOUNTER — TELEPHONE (OUTPATIENT)
Dept: NEUROLOGY | Facility: CLINIC | Age: 64
End: 2024-11-20
Payer: MEDICARE

## 2024-12-09 ENCOUNTER — OFFICE VISIT (OUTPATIENT)
Dept: NEUROLOGY | Facility: CLINIC | Age: 64
End: 2024-12-09
Payer: MEDICARE

## 2024-12-09 VITALS
HEIGHT: 66 IN | RESPIRATION RATE: 12 BRPM | BODY MASS INDEX: 18.65 KG/M2 | SYSTOLIC BLOOD PRESSURE: 124 MMHG | HEART RATE: 57 BPM | WEIGHT: 116.06 LBS | DIASTOLIC BLOOD PRESSURE: 72 MMHG

## 2024-12-09 DIAGNOSIS — R25.1 TREMOR: ICD-10-CM

## 2024-12-09 PROCEDURE — 3074F SYST BP LT 130 MM HG: CPT | Mod: HCNC,CPTII,S$GLB, | Performed by: NURSE PRACTITIONER

## 2024-12-09 PROCEDURE — 1159F MED LIST DOCD IN RCRD: CPT | Mod: HCNC,CPTII,S$GLB, | Performed by: NURSE PRACTITIONER

## 2024-12-09 PROCEDURE — 3008F BODY MASS INDEX DOCD: CPT | Mod: HCNC,CPTII,S$GLB, | Performed by: NURSE PRACTITIONER

## 2024-12-09 PROCEDURE — 99999 PR PBB SHADOW E&M-EST. PATIENT-LVL IV: CPT | Mod: PBBFAC,HCNC,, | Performed by: NURSE PRACTITIONER

## 2024-12-09 PROCEDURE — 3078F DIAST BP <80 MM HG: CPT | Mod: HCNC,CPTII,S$GLB, | Performed by: NURSE PRACTITIONER

## 2024-12-09 PROCEDURE — 1160F RVW MEDS BY RX/DR IN RCRD: CPT | Mod: HCNC,CPTII,S$GLB, | Performed by: NURSE PRACTITIONER

## 2024-12-09 PROCEDURE — 99214 OFFICE O/P EST MOD 30 MIN: CPT | Mod: HCNC,S$GLB,, | Performed by: NURSE PRACTITIONER

## 2024-12-09 NOTE — ASSESSMENT & PLAN NOTE
Patient is 63 y/o female that presents for tremor f/u.    Intermittent, bilateral hand tremor that occurs with action.  vocal tremor that has been ongoing for the 4 years.    - there is associated hoarseness but she denies pain or difficulty swallowing. She does have to strain to speak.      Neuro exam noted with stability. Vocal tremor noted   - likely that her reported hand tremors are mood related but also could be an effect from taking Olanzapine  Suspect ET as her sister and brother also have a tremor  Discussed role of gold standard medication with the patient at length.   - She was started on propranolol 20 mg BID in 2022 but self discontinued it stating it didn't offer aid. She also did not f/u with me.    - 80 mg BID has offered aid but she now reports intermittently dizziness. HR 57 today.   - decreased to 80 mg in AM and 40 mg in PM  Encouraged pt to keep future appts with me   Consider referral to ENT for Botox consideration    - pt opted to hold for now d/t transportation issues

## 2024-12-09 NOTE — PROGRESS NOTES
"  NEUROLOGY  Outpatient Follow Up    [facility]  [address] [facility], [place] [address]  [phone] (office) / [phone] (fax)    Patient Name:  Lizbet Cooley  :  1960  MR #:  [id number]  Acct #:  [id number]    Date of Neurology Visit: 2024  Name of Provider: DEBBY Wagner    Other Physicians:  Tulio Quintero MD (Primary Care Physician); No ref. provider found (Referring)      Chief Complaint: Tremors      History of Present Illness (HPI):  Lizbet Cooley is a right handed 61 y.o. female with a PMHX of anxiety, bipolar disorder, COPD, osteoporosis  Patient is here today for tremor. It is a vocal tremor and has been going on for the last year and a half. She denies throat pain or swallowing difficulty. She did see ENT back in 2021 for hoarseness. A scope was done and reported to be unrevealing. She was told to quit smoking but has yet to do this. She smokes about 1 pack per day. She wants to quit.   She also endorses a hand tremor that started "years ago". She reports it to be to both hands and occurs at rest and when grabbing for items. It is not constant but occurs daily. The vocal tremor is constant.     Patient does suffer severe anxiety and was previously referred to psychiatry but has never seen them. She reports thinking "horrible thoughts" about bad things happening to her family. She denies suicidal ideation. She was previously prescribed Zyprexa years ago for sleep but reports only taking it about 1 week.     She reports that her sister and brother both have a hand tremor. She drinks about 3-4 beers (12 oz) at least 3-4 nights/week. She does this to help her calm down and states it does help with her tremors. She drinks 1 cup of coffee (8oz) every morning and water throughout the day. She reports that her writing is shaky.           Interval Hx 2024:   Patient presents today for follow up on vocal tremor and hand tremors. She is unsure if her " vocal tremor has worsened but states she has to strain to speak. This is bothersome to her.    Her hand tremors remain intermittent and reported to be worse in the morning. They are noticed when handling items. It does not impede on ADLs.   She was previously started on Propranolol in 2022 but states she stopped it after several weeks stating it didn't help. She also didn't follow back up with me.   She also takes Zyprexa PRN for sleep. She reports a great deal of family stressors and needs this medication for sleep aid.   She continues to smoke 1 ppd.       Interval Hx 9/23/2024:  Patient presents today for follow up on vocal tremor and hand tremors. She believes her hand tremor is a little better. She still has to strain to talk. Tremor does not impede on ADLs.  She is tolerating Propranolol well.    She continues to smoke 1 ppd.     Interval Hx 12/9/2024:  Patient presents today for follow up on vocal tremor and hand tremors. Since last visit her propranolol was increased to 80 mg BID. She believes her tremor is stable. She does report intermittent dizziness that is short-lived when she changes positions. This started several weeks ago. She also admits that she may forget to take her night dose about 3 times per week. No lightheadedness or LOC. She does drink plenty of water.               Past Medical, Surgical, Family & Social History:   Reviewed and updated.    Home Medications:     Current Outpatient Medications:     albuterol (PROVENTIL/VENTOLIN HFA) 90 mcg/actuation inhaler, Inhale 2 puffs into the lungs every 6 (six) hours as needed for Wheezing. Rescue, Disp: 25.5 g, Rfl: 3    alendronate (FOSAMAX) 70 MG tablet, Take 1 tablet (70 mg total) by mouth every 7 days., Disp: 12 tablet, Rfl: 3    buprenorphine 10 mcg/hr (BUTRANS) weekly patch, 1 patch every 7 days., Disp: , Rfl:     calcium-vitamin D3 (OS-DARIUSZ 500 + D3) 500 mg-5 mcg (200 unit) per tablet, Take 1 tablet by mouth once daily., Disp: , Rfl:      diclofenac (VOLTAREN) 50 MG EC tablet, Take 50 mg by mouth 3 (three) times daily., Disp: , Rfl:     LIDOcaine-prilocaine (EMLA) cream, Apply 1 gram 3 times a day by topical route as needed for 30 days., Disp: , Rfl:     loratadine (CLARITIN) 10 mg tablet, Take 10 mg by mouth once daily., Disp: , Rfl:     meloxicam (MOBIC) 15 MG tablet, Take 15 mg by mouth daily as needed., Disp: , Rfl:     methocarbamoL (ROBAXIN) 500 MG Tab, Take 500 mg by mouth 2 (two) times daily as needed., Disp: , Rfl:     mv,Ca,min-folic acid-vit K1 400-20 mcg Tab, Take 1 tablet by mouth once daily., Disp: , Rfl:     NARCAN 4 mg/actuation Spry, , Disp: , Rfl:     nicotine (NICODERM CQ) 14 mg/24 hr, Place 1 patch onto the skin once daily. Use these patches after using all the 21mg patches, Disp: 14 patch, Rfl: 0    nicotine (NICODERM CQ) 21 mg/24 hr, Place 1 patch onto the skin once daily. Use these patches first, Disp: 28 patch, Rfl: 0    nicotine (NICODERM CQ) 7 mg/24 hr, Place 1 patch onto the skin once daily. Use these patches after completing the 21mg and 14mg patches, Disp: 14 patch, Rfl: 0    nicotine polacrilex 2 MG Lozg, Take 1 lozenge (2 mg total) by mouth as needed (nicotine craving)., Disp: 30 lozenge, Rfl: 2    OLANZapine (ZYPREXA) 10 MG tablet, Take 1 tablet (10 mg total) by mouth every evening. Start with 1/2 tab nightly for the first week, Disp: 30 tablet, Rfl: 11    oxyCODONE-acetaminophen (PERCOCET)  mg per tablet, TAKE 1 TABLET BY ORAL ROUTE 1   2 TIMES EVERY DAY AS NEEDED., Disp: , Rfl:     propranoloL (INDERAL) 80 MG tablet, Take 1 tablet (80 mg total) by mouth 2 (two) times daily. (Patient taking differently: Take 80 mg by mouth 2 (two) times daily. 80 mg in AM and 40 mg in PM), Disp: 60 tablet, Rfl: 11    SYMBICORT 80-4.5 mcg/actuation HFAA, Inhale 2 puffs into the lungs 2 (two) times a day. Controller, Disp: 30.6 g, Rfl: 3    Physical Examination:  /72 (BP Location: Left arm, Patient Position: Sitting)    "Pulse (!) 57   Resp 12   Ht 5' 6" (1.676 m)   Wt 52.7 kg (116 lb 1.2 oz)   BMI 18.73 kg/m²     GENERAL:  General appearance: Well, non-toxic appearing.  No apparent distress.  Neck: supple.  .    MENTAL STATUS:  Alertness, attention span & concentration: normal.  Language: normal.  Orientation to self, place & time:  normal.  Memory, recent & remote: normal.  Fund of knowledge: normal.      SPEECH:  Vocal tremor noted  Follows complex commands.      CRANIAL NERVES:  Cranial Nerves II-XII were examined.  II - Visual fields: normal.  III, IV, VI: PERRL, EOMI, No ptosis, No nystagmus.  V - Facial sensation: normal.  VII - Face symmetry & mobility: normal.  VIII - Hearing: normal  IX, X - Palate: mobile & midline.  XI - Shoulder shrug: normal.  XII - Tongue protrusion: normal.        GROSS MOTOR:  Gait & station: able to rise from chair without issue; antalgic appearing due to leg and back pain  Tone: normal.  Arms to core: no tremor  Arms extended:  no tremor  Abnormal movements: none.  Finger-nose: normal.  Rapid alternating movements: normal.  Pronator drift: normal  Mild head tremor (improved)    MUSCLE STRENGTH:   Hand grasp:   - right:5/5   - left:5/5    RIGHT    LEFT   5 Deltoids 5   5 Sh.Ext.Rot. 5   5 Sh.Int.Rot. 5   5 Biceps 5   5 Triceps 5   5 Forearm.Pr. 5        5 Iliopsoas flex    5   5 Hip Abduct 5   5 Hip Adduct 5   5 Quads 5   5 Hams 5   5 Dorsiflex 5   5 Plantar Flex 5     REFLEXES:    RIGHT Reflex   LEFT   2 Biceps 2   2 Brachiorad. 2        2 Patellar 2     SENSORY:  Light touch: Normal throughout.             Diagnostic Data Reviewed:   I have personally reviewed provider notes, labs and imaging made available to me today.     Imaging:  CT Head Without Contrast 1/2022    Narrative  EXAMINATION:  CT HEAD WITHOUT CONTRAST    CLINICAL HISTORY:  Facial trauma, blunt;Head trauma, moderate-severe;    TECHNIQUE:  Low dose axial images were obtained through the head.  Coronal and sagittal reformations " were also performed. Contrast was not administered.    COMPARISON:  None.    FINDINGS:  The bone window portion of the examination indicates no obvious evidence of an acute fracture injury of the skull.  The skull base appears normal.  No indication of paranasal sinus congestion.  There is deviation of the nasal septum towards the right.  The mastoid air cells are unremarkable.  Incidental note is made of a thickening and serpiginous appearance of the external auditory canal on the right.    Imaging of the brain parenchyma reveals no evidence of a mass edema midline shift or extra-axial fluid collection.  The gray-white interface is intact.    The lateral ventricles 3rd ventricle and 4th ventricle are also unremarkable.  No obvious gross abnormality involving the cistern or the cerebellum.    In the sagittal plane the corpus callosum, mid brain, brainstem and proximal spinal cord all appear normal.  No indication of a suprasellar mass.  The pituitary and optic chiasm are unremarkable.    The contents of the orbits show no obvious gross abnormality.    Note is made of what appears to be a soft tissue defect just above the left eye most likely representing a laceration or abrasion.    Impression  No apparent evidence of an acute injury involving the skull base skull or facial bones.    Apparent soft tissue defect seen involving the superior aspect of the left orbit roughly at the level of the eye brow.    Irregular appearance of the external auditory canal on the left suggestive of otitis externa.    This report was flagged in Epic as abnormal.      Cardiac:    Labs:  Lab Results   Component Value Date    WBC 8.15 11/04/2022    HGB 15.0 11/04/2022    HCT 45.2 11/04/2022     11/04/2022    MCV 96 11/04/2022    RDW 13.5 11/04/2022     Lab Results   Component Value Date     11/04/2022    K 5.2 (H) 11/04/2022     11/04/2022    CO2 22 (L) 11/04/2022    BUN 9 11/04/2022    CREATININE 0.7 11/04/2022    GLU  "94 11/04/2022    CALCIUM 10.5 11/04/2022     Lab Results   Component Value Date    PROT 7.4 11/04/2022    ALBUMIN 4.3 11/04/2022    BILITOT 0.9 11/04/2022    AST 23 11/04/2022    ALKPHOS 129 11/04/2022    ALT 19 11/04/2022     No results found for: "INR", "PROTIME", "PTT"  Lab Results   Component Value Date    CHOL 274 (H) 10/11/2019    HDL 80 (H) 10/11/2019    LDLCALC 149.0 10/11/2019    TRIG 225 (H) 10/11/2019    CHOLHDL 29.2 10/11/2019     No results found for: "LABA1C", "HGBA1C"   Lab Results   Component Value Date    KGYYSVMA21 400 11/04/2022     Lab Results   Component Value Date    FOLATE 35.3 (H) 04/20/2022     Lab Results   Component Value Date    TSH 0.218 (L) 11/04/2022     No results found for: "RPR"  No results found for: "VITAMINB1"  No components found for: "HIV 1/2 AG/AB"                    Assessment and Plan:      Problem List Items Addressed This Visit          Neuro    Tremor    Current Assessment & Plan     Patient is 63 y/o female that presents for tremor f/u.    Intermittent, bilateral hand tremor that occurs with action.  vocal tremor that has been ongoing for the 4 years.    - there is associated hoarseness but she denies pain or difficulty swallowing. She does have to strain to speak.      Neuro exam noted with stability. Vocal tremor noted   - likely that her reported hand tremors are mood related but also could be an effect from taking Olanzapine  Suspect ET as her sister and brother also have a tremor  Discussed role of gold standard medication with the patient at length.   - She was started on propranolol 20 mg BID in 2022 but self discontinued it stating it didn't offer aid. She also did not f/u with me.    - 80 mg BID has offered aid but she now reports intermittently dizziness. HR 57 today.   - decreased to 80 mg in AM and 40 mg in PM  Encouraged pt to keep future appts with me   Consider referral to ENT for Botox consideration    - pt opted to hold for now d/t transportation issues "                         Important to note, also  has a past medical history of Bipolar disorder and Trimalleolar fracture of ankle, closed, left, initial encounter (11/14/2019).          The patient will return to clinic in 4 months.    All questions were answered and patient is comfortable with the plan.         Thank you very much for the opportunity to assist in this patient's care.    If you have any questions or concerns, please do not hesitate to contact me at any time.      Sincerely,     DEBBY Wagner  [facility] [place]

## 2025-01-13 DIAGNOSIS — Z00.00 ENCOUNTER FOR MEDICARE ANNUAL WELLNESS EXAM: ICD-10-CM

## 2025-01-16 DIAGNOSIS — J44.9 CHRONIC OBSTRUCTIVE PULMONARY DISEASE, UNSPECIFIED COPD TYPE: ICD-10-CM

## 2025-01-16 RX ORDER — ALBUTEROL SULFATE 90 UG/1
2 INHALANT RESPIRATORY (INHALATION) EVERY 6 HOURS PRN
Qty: 25.5 G | Refills: 3 | Status: SHIPPED | OUTPATIENT
Start: 2025-01-16

## 2025-01-16 NOTE — TELEPHONE ENCOUNTER
Care Due:                  Date            Visit Type   Department     Provider  --------------------------------------------------------------------------------                                EP -                              PRIMARY      [facility] FAM[facility]  Last Visit: 01-      CARE ([facility])   MEDICINE       Tulio Quintero  Next Visit: None Scheduled  None         None Found                                                            Last  Test          Frequency    Reason                     Performed    Due Date  --------------------------------------------------------------------------------    Office Visit  12 months..  SYMBICORT, albuterol,      01-   01-                             alendronate, nicotine....    CMP.........  12 months..  alendronate..............  Not Found    Overdue    Mg Level....  12 months..  alendronate..............  Not Found    Overdue    Phosphate...  12 months..  alendronate..............  Not Found    Overdue    [employer] Embedded Care Due Messages. Reference number: [id number].   1/16/2025 3:21:04 PM CST

## 2025-01-16 NOTE — TELEPHONE ENCOUNTER
LOV 01/10/24  Tried to call pt to [facility] annual. No answer. VM box is full cannot leave message.

## 2025-01-30 ENCOUNTER — PATIENT OUTREACH (OUTPATIENT)
Dept: ADMINISTRATIVE | Facility: HOSPITAL | Age: 65
End: 2025-01-30
Payer: MEDICARE

## 2025-01-30 NOTE — PROGRESS NOTES
Population Health Chart Review & Patient Outreach Details      Additional [facility] Health Notes:               Updates Requested / Reviewed:      Updated Care Coordination Note, Care Everywhere, , and Immunizations Reconciliation Completed or Queried: [facility] Topics Overdue:      [facility] Score: 2     Mammogram  LDCT Lung Screen    Influenza Vaccine  Pneumonia Vaccine  Shingles/Zoster Vaccine  RSV Vaccine                  Health Maintenance Topic(s) Outreach Outcomes & Actions Taken:    Colorectal Cancer Screening - Outreach Outcomes & Actions Taken  : Colon screening.

## 2025-04-15 DIAGNOSIS — M80.00XA OSTEOPOROSIS WITH CURRENT PATHOLOGICAL FRACTURE, UNSPECIFIED OSTEOPOROSIS TYPE, INITIAL ENCOUNTER: ICD-10-CM

## 2025-04-15 RX ORDER — ALENDRONATE SODIUM 70 MG/1
70 TABLET ORAL
Qty: 12 TABLET | Refills: 3 | OUTPATIENT
Start: 2025-04-15

## 2025-04-15 NOTE — TELEPHONE ENCOUNTER
Care Due:                  Date            Visit Type   Department     Provider  --------------------------------------------------------------------------------                                EP -                              PRIMARY      [facility] FAMILY  Last Visit: 01-      CARE ([facility])   MEDICINE       Tulio Quintero  Next Visit: None Scheduled  None         None Found                                                            Last  Test          Frequency    Reason                     Performed    Due Date  --------------------------------------------------------------------------------    Office Visit  12 months..  SYMBICORT, albuterol,      01-   01-                             alendronate, nicotine....    CMP.........  12 months..  alendronate..............  Not Found    Overdue    Mg Level....  12 months..  alendronate..............  Not Found    Overdue    Phosphate...  12 months..  alendronate..............  Not Found    Overdue    [employer] Embedded Care Due Messages. Reference number: [id number].   4/15/2025 2:34:01 PM CDT